# Patient Record
Sex: FEMALE | Race: WHITE | NOT HISPANIC OR LATINO | URBAN - METROPOLITAN AREA
[De-identification: names, ages, dates, MRNs, and addresses within clinical notes are randomized per-mention and may not be internally consistent; named-entity substitution may affect disease eponyms.]

---

## 2021-03-17 ENCOUNTER — INPATIENT (INPATIENT)
Facility: HOSPITAL | Age: 61
LOS: 1 days | Discharge: HOME | End: 2021-03-19
Attending: STUDENT IN AN ORGANIZED HEALTH CARE EDUCATION/TRAINING PROGRAM | Admitting: STUDENT IN AN ORGANIZED HEALTH CARE EDUCATION/TRAINING PROGRAM
Payer: COMMERCIAL

## 2021-03-17 ENCOUNTER — TRANSCRIPTION ENCOUNTER (OUTPATIENT)
Age: 61
End: 2021-03-17

## 2021-03-17 VITALS
OXYGEN SATURATION: 99 % | DIASTOLIC BLOOD PRESSURE: 80 MMHG | TEMPERATURE: 98 F | SYSTOLIC BLOOD PRESSURE: 181 MMHG | HEART RATE: 70 BPM | RESPIRATION RATE: 20 BRPM

## 2021-03-17 DIAGNOSIS — K81.0 ACUTE CHOLECYSTITIS: ICD-10-CM

## 2021-03-17 LAB
ALBUMIN SERPL ELPH-MCNC: 4.6 G/DL — SIGNIFICANT CHANGE UP (ref 3.3–5)
ALP SERPL-CCNC: 88 U/L — SIGNIFICANT CHANGE UP (ref 40–120)
ALT FLD-CCNC: 44 U/L — HIGH (ref 4–33)
ANION GAP SERPL CALC-SCNC: 11 MMOL/L — SIGNIFICANT CHANGE UP (ref 7–14)
APTT BLD: 28.3 SEC — SIGNIFICANT CHANGE UP (ref 27–36.3)
AST SERPL-CCNC: 41 U/L — HIGH (ref 4–32)
BASOPHILS # BLD AUTO: 0 K/UL — SIGNIFICANT CHANGE UP (ref 0–0.2)
BASOPHILS NFR BLD AUTO: 0 % — SIGNIFICANT CHANGE UP (ref 0–2)
BILIRUB SERPL-MCNC: 0.3 MG/DL — SIGNIFICANT CHANGE UP (ref 0.2–1.2)
BLD GP AB SCN SERPL QL: NEGATIVE — SIGNIFICANT CHANGE UP
BUN SERPL-MCNC: 19 MG/DL — SIGNIFICANT CHANGE UP (ref 7–23)
CALCIUM SERPL-MCNC: 9.6 MG/DL — SIGNIFICANT CHANGE UP (ref 8.4–10.5)
CHLORIDE SERPL-SCNC: 103 MMOL/L — SIGNIFICANT CHANGE UP (ref 98–107)
CO2 SERPL-SCNC: 27 MMOL/L — SIGNIFICANT CHANGE UP (ref 22–31)
CREAT SERPL-MCNC: 0.72 MG/DL — SIGNIFICANT CHANGE UP (ref 0.5–1.3)
EOSINOPHIL # BLD AUTO: 0 K/UL — SIGNIFICANT CHANGE UP (ref 0–0.5)
EOSINOPHIL NFR BLD AUTO: 0 % — SIGNIFICANT CHANGE UP (ref 0–6)
GLUCOSE SERPL-MCNC: 120 MG/DL — HIGH (ref 70–99)
HCT VFR BLD CALC: 44.2 % — SIGNIFICANT CHANGE UP (ref 34.5–45)
HGB BLD-MCNC: 14.1 G/DL — SIGNIFICANT CHANGE UP (ref 11.5–15.5)
IANC: 7.48 K/UL — SIGNIFICANT CHANGE UP (ref 1.5–8.5)
INR BLD: 1.05 RATIO — SIGNIFICANT CHANGE UP (ref 0.88–1.16)
LIDOCAIN IGE QN: 29 U/L — SIGNIFICANT CHANGE UP (ref 7–60)
LYMPHOCYTES # BLD AUTO: 0.46 K/UL — LOW (ref 1–3.3)
LYMPHOCYTES # BLD AUTO: 5.3 % — LOW (ref 13–44)
MAGNESIUM SERPL-MCNC: 1.9 MG/DL — SIGNIFICANT CHANGE UP (ref 1.6–2.6)
MANUAL SMEAR VERIFICATION: SIGNIFICANT CHANGE UP
MCHC RBC-ENTMCNC: 29.5 PG — SIGNIFICANT CHANGE UP (ref 27–34)
MCHC RBC-ENTMCNC: 31.9 GM/DL — LOW (ref 32–36)
MCV RBC AUTO: 92.5 FL — SIGNIFICANT CHANGE UP (ref 80–100)
MONOCYTES # BLD AUTO: 0.15 K/UL — SIGNIFICANT CHANGE UP (ref 0–0.9)
MONOCYTES NFR BLD AUTO: 1.8 % — LOW (ref 2–14)
NEUTROPHILS # BLD AUTO: 7.83 K/UL — HIGH (ref 1.8–7.4)
NEUTROPHILS NFR BLD AUTO: 91.1 % — HIGH (ref 43–77)
PHOSPHATE SERPL-MCNC: 3.1 MG/DL — SIGNIFICANT CHANGE UP (ref 2.5–4.5)
PLAT MORPH BLD: ABNORMAL
PLATELET # BLD AUTO: 234 K/UL — SIGNIFICANT CHANGE UP (ref 150–400)
PLATELET COUNT - ESTIMATE: NORMAL — SIGNIFICANT CHANGE UP
POLYCHROMASIA BLD QL SMEAR: SIGNIFICANT CHANGE UP
POTASSIUM SERPL-MCNC: 4.1 MMOL/L — SIGNIFICANT CHANGE UP (ref 3.5–5.3)
POTASSIUM SERPL-SCNC: 4.1 MMOL/L — SIGNIFICANT CHANGE UP (ref 3.5–5.3)
PROT SERPL-MCNC: 7.6 G/DL — SIGNIFICANT CHANGE UP (ref 6–8.3)
PROTHROM AB SERPL-ACNC: 12 SEC — SIGNIFICANT CHANGE UP (ref 10.6–13.6)
RBC # BLD: 4.78 M/UL — SIGNIFICANT CHANGE UP (ref 3.8–5.2)
RBC # FLD: 12 % — SIGNIFICANT CHANGE UP (ref 10.3–14.5)
RBC BLD AUTO: ABNORMAL
RH IG SCN BLD-IMP: POSITIVE — SIGNIFICANT CHANGE UP
SODIUM SERPL-SCNC: 141 MMOL/L — SIGNIFICANT CHANGE UP (ref 135–145)
VARIANT LYMPHS # BLD: 1.8 % — SIGNIFICANT CHANGE UP (ref 0–6)
WBC # BLD: 8.6 K/UL — SIGNIFICANT CHANGE UP (ref 3.8–10.5)
WBC # FLD AUTO: 8.6 K/UL — SIGNIFICANT CHANGE UP (ref 3.8–10.5)

## 2021-03-17 PROCEDURE — 74177 CT ABD & PELVIS W/CONTRAST: CPT | Mod: 26

## 2021-03-17 PROCEDURE — 99221 1ST HOSP IP/OBS SF/LOW 40: CPT | Mod: 57

## 2021-03-17 PROCEDURE — 99285 EMERGENCY DEPT VISIT HI MDM: CPT | Mod: 25

## 2021-03-17 RX ORDER — SODIUM CHLORIDE 9 MG/ML
1000 INJECTION, SOLUTION INTRAVENOUS
Refills: 0 | Status: DISCONTINUED | OUTPATIENT
Start: 2021-03-17 | End: 2021-03-18

## 2021-03-17 RX ORDER — ASPIRIN/CALCIUM CARB/MAGNESIUM 324 MG
1 TABLET ORAL
Qty: 0 | Refills: 0 | DISCHARGE

## 2021-03-17 RX ORDER — PIPERACILLIN AND TAZOBACTAM 4; .5 G/20ML; G/20ML
3.38 INJECTION, POWDER, LYOPHILIZED, FOR SOLUTION INTRAVENOUS ONCE
Refills: 0 | Status: COMPLETED | OUTPATIENT
Start: 2021-03-17 | End: 2021-03-17

## 2021-03-17 RX ORDER — FAMOTIDINE 10 MG/ML
20 INJECTION INTRAVENOUS ONCE
Refills: 0 | Status: COMPLETED | OUTPATIENT
Start: 2021-03-17 | End: 2021-03-17

## 2021-03-17 RX ORDER — ACETAMINOPHEN 500 MG
1000 TABLET ORAL ONCE
Refills: 0 | Status: COMPLETED | OUTPATIENT
Start: 2021-03-17 | End: 2021-03-18

## 2021-03-17 RX ORDER — PIPERACILLIN AND TAZOBACTAM 4; .5 G/20ML; G/20ML
3.38 INJECTION, POWDER, LYOPHILIZED, FOR SOLUTION INTRAVENOUS EVERY 8 HOURS
Refills: 0 | Status: DISCONTINUED | OUTPATIENT
Start: 2021-03-17 | End: 2021-03-18

## 2021-03-17 RX ORDER — ONDANSETRON 8 MG/1
4 TABLET, FILM COATED ORAL ONCE
Refills: 0 | Status: COMPLETED | OUTPATIENT
Start: 2021-03-17 | End: 2021-03-17

## 2021-03-17 RX ORDER — SODIUM CHLORIDE 9 MG/ML
1000 INJECTION, SOLUTION INTRAVENOUS ONCE
Refills: 0 | Status: COMPLETED | OUTPATIENT
Start: 2021-03-17 | End: 2021-03-17

## 2021-03-17 RX ORDER — ENOXAPARIN SODIUM 100 MG/ML
40 INJECTION SUBCUTANEOUS DAILY
Refills: 0 | Status: DISCONTINUED | OUTPATIENT
Start: 2021-03-17 | End: 2021-03-19

## 2021-03-17 RX ORDER — HYDROMORPHONE HYDROCHLORIDE 2 MG/ML
1 INJECTION INTRAMUSCULAR; INTRAVENOUS; SUBCUTANEOUS EVERY 4 HOURS
Refills: 0 | Status: DISCONTINUED | OUTPATIENT
Start: 2021-03-17 | End: 2021-03-18

## 2021-03-17 RX ORDER — SODIUM CHLORIDE 9 MG/ML
1000 INJECTION, SOLUTION INTRAVENOUS ONCE
Refills: 0 | Status: COMPLETED | OUTPATIENT
Start: 2021-03-17 | End: 2021-03-18

## 2021-03-17 RX ORDER — METOPROLOL TARTRATE 50 MG
5 TABLET ORAL EVERY 6 HOURS
Refills: 0 | Status: DISCONTINUED | OUTPATIENT
Start: 2021-03-17 | End: 2021-03-18

## 2021-03-17 RX ORDER — ATORVASTATIN CALCIUM 80 MG/1
1 TABLET, FILM COATED ORAL
Qty: 0 | Refills: 0 | DISCHARGE

## 2021-03-17 RX ORDER — METOCLOPRAMIDE HCL 10 MG
10 TABLET ORAL ONCE
Refills: 0 | Status: COMPLETED | OUTPATIENT
Start: 2021-03-17 | End: 2021-03-17

## 2021-03-17 RX ORDER — HYDROMORPHONE HYDROCHLORIDE 2 MG/ML
0.5 INJECTION INTRAMUSCULAR; INTRAVENOUS; SUBCUTANEOUS EVERY 4 HOURS
Refills: 0 | Status: DISCONTINUED | OUTPATIENT
Start: 2021-03-17 | End: 2021-03-19

## 2021-03-17 RX ORDER — PIPERACILLIN AND TAZOBACTAM 4; .5 G/20ML; G/20ML
3.38 INJECTION, POWDER, LYOPHILIZED, FOR SOLUTION INTRAVENOUS ONCE
Refills: 0 | Status: DISCONTINUED | OUTPATIENT
Start: 2021-03-17 | End: 2021-03-17

## 2021-03-17 RX ADMIN — SODIUM CHLORIDE 1000 MILLILITER(S): 9 INJECTION, SOLUTION INTRAVENOUS at 16:00

## 2021-03-17 RX ADMIN — Medication 10 MILLIGRAM(S): at 17:05

## 2021-03-17 RX ADMIN — ONDANSETRON 4 MILLIGRAM(S): 8 TABLET, FILM COATED ORAL at 16:38

## 2021-03-17 RX ADMIN — Medication 0.5 MILLIGRAM(S): at 18:52

## 2021-03-17 RX ADMIN — Medication 10 MILLIGRAM(S): at 18:00

## 2021-03-17 RX ADMIN — PIPERACILLIN AND TAZOBACTAM 200 GRAM(S): 4; .5 INJECTION, POWDER, LYOPHILIZED, FOR SOLUTION INTRAVENOUS at 22:43

## 2021-03-17 RX ADMIN — FAMOTIDINE 20 MILLIGRAM(S): 10 INJECTION INTRAVENOUS at 21:37

## 2021-03-17 RX ADMIN — ONDANSETRON 4 MILLIGRAM(S): 8 TABLET, FILM COATED ORAL at 16:00

## 2021-03-17 NOTE — ED ADULT NURSE NOTE - OBJECTIVE STATEMENT
Pt. A&Ox4, c/o epigastric pain with n/v starting today. Denies diarrhea or fevers. #20g to right AC, labs sent. MD at bedside will continue to monitor.

## 2021-03-17 NOTE — H&P ADULT - ASSESSMENT
Assessment/Plan: 60y Female with acute cholecystitis.   Constant RUQ pain with nausea/vomiting. Describes previous episodes of biliary colic.  CT shows thickened gallbladder with large stones. Mild transaminitis, otherwise normal LFTs.    - Add on and consent for laparoscopic cholecystectomy. Risks/benefits of procedure, including CBD injury, explained to the patient.  - COVID pending  - NPO, IV hydration  - Zosyn    Pager [phone] Assessment/Plan: 60y Female with acute cholecystitis.   Constant RUQ pain with nausea/vomiting. Describes previous episodes of biliary colic.  CT shows thickened gallbladder with large stones. Mild transaminitis, otherwise normal LFTs.    - Add on and consent for laparoscopic cholecystectomy, poss open. Risks/benefits of procedure, including CBD injury, explained to the patient.  - COVID collected and results pending  - NPO, IV hydration  - Zosyn    Discussed with Dr. De Leon  Pager [phone]

## 2021-03-17 NOTE — ED PROVIDER NOTE - ATTENDING CONTRIBUTION TO CARE
61 y/o F with no PMH p/w increased vomiting and stomach cramping after eating chicken cooked with sour cream. She states she has had several episodes of vomiting, bilious vomiting  She has an intolerance to dairy. The pain is cramping located in the upper abdomen. She denies fever, chills, diarrhea, dysuria. last bowel movement was today  Pt tried a fentanyl patch for pain w/o improvement  denies fever, chills, chest pain, SOB, +abdominal pain, diarrhea, dysuria, syncope, bleeding, new rash,weakness, numbness, blurred vision  +N/V  ROS  otherwise negative as per HPI  Gen: Awake, Alert, WD, WN, NAD  Head:  NC/AT  Eyes:  PERRL, EOMI, Conjunctiva pink, lids normal, no scleral icterus  ENT: , moist mucus membranes  Neck: supple, nontender, no meningismus, no JVD, trachea midline  Cardiac/CV:  S1 S2, RRR, no M/G/R  Respiratory/Pulm:  CTAB, good air movement, normal resp effort, no wheezes/stridor/retractions/rales/rhonchi  Gastrointestinal/Abdomen:  Soft, nontender, nondistended, +BS, no rebound/guarding  Back:  no CVAT, no MLT  Ext:  warm, well perfused, moving all extremities spontaneously, no peripheral edema, distal pulses intact  Skin: intact, no rash  Neuro:  AAOx3, sensation intact, motor 5/5 x 4 extremities, normal gait, speech clear  MDM as above

## 2021-03-17 NOTE — ED ADULT NURSE NOTE - NS ED NOTE ABUSE RESPONSE YN
----- Message from Robin Jones MD sent at 10/6/2020  2:31 PM CDT -----  Clean UA, wait for culture.   Yes

## 2021-03-17 NOTE — H&P ADULT - NSHPPHYSICALEXAM_GEN_ALL_CORE
Vital Signs Last 24 Hrs  T(C): 37.2 (17 Mar 2021 21:10), Max: 37.2 (17 Mar 2021 21:10)  T(F): 98.9 (17 Mar 2021 21:10), Max: 98.9 (17 Mar 2021 21:10)  HR: 68 (17 Mar 2021 21:10) (68 - 71)  BP: 143/69 (17 Mar 2021 21:10) (143/69 - 181/80)  BP(mean): --  RR: 17 (17 Mar 2021 21:10) (17 - 20)  SpO2: 100% (17 Mar 2021 21:10) (99% - 100%)    General: A&Ox3, NAD.  Neuro: Motor and sensory grossly intact with no focal deficits.  HEENT: Anicteric sclerae.  Respiratory: Unlabored breathing.   CVS: Regular rate and rhythm.  Abdomen: Soft, non-distended, RUQ tenderness.  Extremities: Warm bilaterally w/ palpable pulses.   MSK: Intact ROM.

## 2021-03-17 NOTE — H&P ADULT - ATTENDING COMMENTS
Patient with acute cholecystitis and thickened wall on CT scan.  plan  iv antibiotics  or for lap sebastián    I have personally interviewed and examined this patient, reviewed pertinent labs and imaging, and discussed the case with colleagues, residents, and physician assistants on B Team rounds.    The active care issues are:  1. acute cholecystitis    The Acute Care Surgery (B Team) Attending Group Practice:  Dr. Eleazar Cardoso, Dr. Cristopher Olivraes, Dr. Julio Cordero, Dr. Abran De Leon,     urgent issues - spectra [id number]  nonurgent issues - [phone]  patient appointments or afterhours - [phone]

## 2021-03-17 NOTE — ED PROVIDER NOTE - OBJECTIVE STATEMENT
59 y/o F presents to the ED w/ gradual onset of nausea and vomiting. Pt states she vomiting 25x in the past day, non bloody. Pt states this started after eating chicken cutlet cooked w/ sour cream. Pt has an intolerance to dairy. States in the past when she ate dairy, she experienced GI symptoms such as increased gas, stomach cramps and nausea. Pt is in the process of switching her gastroenterologist. She had a colonoscopy last year that was normal. Her last endoscopy was 3 years ago. Pt had a normal bowel movement this morning. Denies diarrhea. Pt is still passing gas. Pt describes her abdominal pain as cramping. Denies any fever, chills, chest pain or shortness of breath. No one else who ate the meal yesterday got sick at home. No abdominal surgical hx. Pt tried a fentanyl patch last night at 4AM. Her PCP prescribes this for her intense abdominal pain.

## 2021-03-17 NOTE — ED PROVIDER NOTE - CLINICAL SUMMARY MEDICAL DECISION MAKING FREE TEXT BOX
59 y/o F with no PMH p/w increased vomiting and stomach cramping after eating chicken cooked with sour cream. She states she has had several episodes of vomiting, bilious vomiting . SHe has an intolerance to dairy. The pain is cramping located in the upper abdomen.  benign exam, likely 2/2 dairy intolerance, nause amedication, ivf, cmp, cbc, reassessment 61 y/o F with no PMH p/w increased vomiting and stomach cramping after eating chicken cooked with sour cream. She states she has had several episodes of vomiting, bilious vomiting . SHe has an intolerance to dairy. The pain is cramping located in the upper abdomen.  benign exam, likely 2/2 dairy intolerance, nausea medication, ivf, cmp, cbc, reassessment 59 y/o F with no PMH p/w increased vomiting and stomach cramping after eating chicken cooked with sour cream. She states she has had several episodes of vomiting, bilious vomiting . She has an intolerance to dairy. The pain is cramping located in the upper abdomen.  benign exam, likely 2/2 dairy intolerance vs pancreatitis vs colitis . will give nausea medication, ivf, cmp, cbc, reassessment if symptoms do not improve ct abdomen

## 2021-03-17 NOTE — H&P ADULT - HISTORY OF PRESENT ILLNESS
60-year-old female no PMH/PSH presents with one day of constant, RUQ abdominal pain associated with several episodes of bilious vomiting. Pain started after dinner time (chicken with sour cream) the day prior and has been unrelieved with Gas-X. Describes having upper abdomina/RUQ abdominal pain before, happens every 2 months, but episodes would self-resolves. Denies fevers/chills. 60-year-old female no abdominal surgery, HTN, HLD presents with one day of constant, RUQ abdominal pain associated with several episodes of bilious vomiting. Pain started after dinner time (chicken with sour cream) the day prior and has been unrelieved with Gas-X. Describes having upper abdomina/RUQ abdominal pain before, happens every 2 months, but episodes would self-resolves. Denies fevers/chills.

## 2021-03-17 NOTE — ED ADULT TRIAGE NOTE - CHIEF COMPLAINT QUOTE
pt coming with nausea/ vomiting/with ABD pain, last BM this AM normal color.   pt stated since she had dinner ABD pain started.      2nd Moderna vac on 3/14/21   Hx. GI problems? HTN did not take her meds today.

## 2021-03-17 NOTE — H&P ADULT - NSHPLABSRESULTS_GEN_ALL_CORE
CBC Full  -  ( 17 Mar 2021 17:05 )  WBC Count : 8.60 K/uL  RBC Count : 4.78 M/uL  Hemoglobin : 14.1 g/dL  Hematocrit : 44.2 %  Platelet Count - Automated : 234 K/uL  Mean Cell Volume : 92.5 fL  Mean Cell Hemoglobin : 29.5 pg  Mean Cell Hemoglobin Concentration : 31.9 gm/dL  Auto Neutrophil # : 7.83 K/uL  Auto Lymphocyte # : 0.46 K/uL  Auto Monocyte # : 0.15 K/uL  Auto Eosinophil # : 0.00 K/uL  Auto Basophil # : 0.00 K/uL  Auto Neutrophil % : 91.1 %  Auto Lymphocyte % : 5.3 %  Auto Monocyte % : 1.8 %  Auto Eosinophil % : 0.0 %  Auto Basophil % : 0.0 %    03-17    141  |  103  |  19  ----------------------------<  120<H>  4.1   |  27  |  0.72    Ca    9.6      17 Mar 2021 17:05  Phos  3.1     03-17  Mg     1.9     03-17    TPro  7.6  /  Alb  4.6  /  TBili  0.3  /  DBili  x   /  AST  41<H>  /  ALT  44<H>  /  AlkPhos  88  03-17    LIVER FUNCTIONS - ( 17 Mar 2021 17:05 )  Alb: 4.6 g/dL / Pro: 7.6 g/dL / ALK PHOS: 88 U/L / ALT: 44 U/L / AST: 41 U/L / GGT: x             RADIOLOGY:  CT Abdomen and Pelvis w/ IV Cont (03.17.21 @ 20:55)     LIVER: Within normal limits.  BILE DUCTS: Normal caliber.  GALLBLADDER: Multiple gallstones. Marked gallbladder wall edema with gallbladder wall thickness up to 10 mm.  SPLEEN: Within normal limits.  PANCREAS: Within normal limits.  ADRENALS: Within normal limits.  KIDNEYS/URETERS: No renal stones or hydronephrosis.    BLADDER: Within normal limits.  REPRODUCTIVE ORGANS: Uterus and adnexa within normal limits.    BOWEL: No bowel obstruction or overt bowel wall thickening. Appendix is normal. Colonic diverticulosis without evidence of diverticulitis.  PERITONEUM: No ascites, pneumoperitoneum, or loculated collection. No mesenteric lymphadenopathy.  VESSELS: Mild atherosclerotic calcification. Normal caliber abdominal aorta.  RETROPERITONEUM/LYMPH NODES: No lymphadenopathy.  ABDOMINAL WALL: Small fat-containing umbilical hernia.  BONES: Mild degenerative changes of the spine.    IMPRESSION:  Multiple gallstones with marked gallbladder wall edema and gallbladder wall thickness up to 10 mm concerning for acute cholecystitis.

## 2021-03-18 ENCOUNTER — TRANSCRIPTION ENCOUNTER (OUTPATIENT)
Age: 61
End: 2021-03-18

## 2021-03-18 ENCOUNTER — RESULT REVIEW (OUTPATIENT)
Age: 61
End: 2021-03-18

## 2021-03-18 LAB
ALBUMIN SERPL ELPH-MCNC: 3.6 G/DL — SIGNIFICANT CHANGE UP (ref 3.3–5)
ALP SERPL-CCNC: 68 U/L — SIGNIFICANT CHANGE UP (ref 40–120)
ALT FLD-CCNC: 29 U/L — SIGNIFICANT CHANGE UP (ref 4–33)
ANION GAP SERPL CALC-SCNC: 8 MMOL/L — SIGNIFICANT CHANGE UP (ref 7–14)
AST SERPL-CCNC: 21 U/L — SIGNIFICANT CHANGE UP (ref 4–32)
BILIRUB SERPL-MCNC: 0.3 MG/DL — SIGNIFICANT CHANGE UP (ref 0.2–1.2)
BLD GP AB SCN SERPL QL: NEGATIVE — SIGNIFICANT CHANGE UP
BUN SERPL-MCNC: 12 MG/DL — SIGNIFICANT CHANGE UP (ref 7–23)
CALCIUM SERPL-MCNC: 9.1 MG/DL — SIGNIFICANT CHANGE UP (ref 8.4–10.5)
CHLORIDE SERPL-SCNC: 105 MMOL/L — SIGNIFICANT CHANGE UP (ref 98–107)
CO2 SERPL-SCNC: 29 MMOL/L — SIGNIFICANT CHANGE UP (ref 22–31)
CREAT SERPL-MCNC: 0.79 MG/DL — SIGNIFICANT CHANGE UP (ref 0.5–1.3)
GLUCOSE SERPL-MCNC: 101 MG/DL — HIGH (ref 70–99)
HCT VFR BLD CALC: 35.6 % — SIGNIFICANT CHANGE UP (ref 34.5–45)
HGB BLD-MCNC: 11.3 G/DL — LOW (ref 11.5–15.5)
MAGNESIUM SERPL-MCNC: 2.1 MG/DL — SIGNIFICANT CHANGE UP (ref 1.6–2.6)
MCHC RBC-ENTMCNC: 29.1 PG — SIGNIFICANT CHANGE UP (ref 27–34)
MCHC RBC-ENTMCNC: 31.7 GM/DL — LOW (ref 32–36)
MCV RBC AUTO: 91.8 FL — SIGNIFICANT CHANGE UP (ref 80–100)
NRBC # BLD: 0 /100 WBCS — SIGNIFICANT CHANGE UP
NRBC # FLD: 0 K/UL — SIGNIFICANT CHANGE UP
PHOSPHATE SERPL-MCNC: 3 MG/DL — SIGNIFICANT CHANGE UP (ref 2.5–4.5)
PLATELET # BLD AUTO: 196 K/UL — SIGNIFICANT CHANGE UP (ref 150–400)
POTASSIUM SERPL-MCNC: 4 MMOL/L — SIGNIFICANT CHANGE UP (ref 3.5–5.3)
POTASSIUM SERPL-SCNC: 4 MMOL/L — SIGNIFICANT CHANGE UP (ref 3.5–5.3)
PROT SERPL-MCNC: 6.1 G/DL — SIGNIFICANT CHANGE UP (ref 6–8.3)
RBC # BLD: 3.88 M/UL — SIGNIFICANT CHANGE UP (ref 3.8–5.2)
RBC # FLD: 12.5 % — SIGNIFICANT CHANGE UP (ref 10.3–14.5)
RH IG SCN BLD-IMP: POSITIVE — SIGNIFICANT CHANGE UP
SARS-COV-2 RNA SPEC QL NAA+PROBE: SIGNIFICANT CHANGE UP
SODIUM SERPL-SCNC: 142 MMOL/L — SIGNIFICANT CHANGE UP (ref 135–145)
WBC # BLD: 8.24 K/UL — SIGNIFICANT CHANGE UP (ref 3.8–10.5)
WBC # FLD AUTO: 8.24 K/UL — SIGNIFICANT CHANGE UP (ref 3.8–10.5)

## 2021-03-18 PROCEDURE — 88304 TISSUE EXAM BY PATHOLOGIST: CPT | Mod: 26

## 2021-03-18 PROCEDURE — 47562 LAPAROSCOPIC CHOLECYSTECTOMY: CPT | Mod: GC

## 2021-03-18 PROCEDURE — 58660 LAPAROSCOPY LYSIS: CPT | Mod: GC

## 2021-03-18 RX ORDER — LISINOPRIL 2.5 MG/1
1 TABLET ORAL
Qty: 0 | Refills: 0 | DISCHARGE

## 2021-03-18 RX ORDER — ACETAMINOPHEN 500 MG
2 TABLET ORAL
Qty: 0 | Refills: 0 | DISCHARGE
Start: 2021-03-18

## 2021-03-18 RX ORDER — LISINOPRIL 2.5 MG/1
1 TABLET ORAL
Qty: 10 | Refills: 0
Start: 2021-03-18

## 2021-03-18 RX ORDER — FENTANYL CITRATE 50 UG/ML
25 INJECTION INTRAVENOUS
Refills: 0 | Status: DISCONTINUED | OUTPATIENT
Start: 2021-03-18 | End: 2021-03-18

## 2021-03-18 RX ORDER — HYDROMORPHONE HYDROCHLORIDE 2 MG/ML
0.5 INJECTION INTRAMUSCULAR; INTRAVENOUS; SUBCUTANEOUS
Refills: 0 | Status: DISCONTINUED | OUTPATIENT
Start: 2021-03-18 | End: 2021-03-18

## 2021-03-18 RX ORDER — OXYCODONE HYDROCHLORIDE 5 MG/1
5 TABLET ORAL EVERY 4 HOURS
Refills: 0 | Status: DISCONTINUED | OUTPATIENT
Start: 2021-03-18 | End: 2021-03-19

## 2021-03-18 RX ORDER — OXYCODONE HYDROCHLORIDE 5 MG/1
1 TABLET ORAL
Qty: 12 | Refills: 0
Start: 2021-03-18 | End: 2021-03-20

## 2021-03-18 RX ORDER — ACETAMINOPHEN 500 MG
650 TABLET ORAL EVERY 6 HOURS
Refills: 0 | Status: DISCONTINUED | OUTPATIENT
Start: 2021-03-18 | End: 2021-03-19

## 2021-03-18 RX ORDER — ONDANSETRON 8 MG/1
4 TABLET, FILM COATED ORAL ONCE
Refills: 0 | Status: DISCONTINUED | OUTPATIENT
Start: 2021-03-18 | End: 2021-03-18

## 2021-03-18 RX ADMIN — FENTANYL CITRATE 25 MICROGRAM(S): 50 INJECTION INTRAVENOUS at 17:30

## 2021-03-18 RX ADMIN — FENTANYL CITRATE 25 MICROGRAM(S): 50 INJECTION INTRAVENOUS at 17:46

## 2021-03-18 RX ADMIN — Medication 400 MILLIGRAM(S): at 12:21

## 2021-03-18 RX ADMIN — ENOXAPARIN SODIUM 40 MILLIGRAM(S): 100 INJECTION SUBCUTANEOUS at 12:12

## 2021-03-18 RX ADMIN — HYDROMORPHONE HYDROCHLORIDE 0.5 MILLIGRAM(S): 2 INJECTION INTRAMUSCULAR; INTRAVENOUS; SUBCUTANEOUS at 21:09

## 2021-03-18 RX ADMIN — FENTANYL CITRATE 25 MICROGRAM(S): 50 INJECTION INTRAVENOUS at 17:15

## 2021-03-18 RX ADMIN — PIPERACILLIN AND TAZOBACTAM 25 GRAM(S): 4; .5 INJECTION, POWDER, LYOPHILIZED, FOR SOLUTION INTRAVENOUS at 06:12

## 2021-03-18 RX ADMIN — FENTANYL CITRATE 25 MICROGRAM(S): 50 INJECTION INTRAVENOUS at 17:31

## 2021-03-18 RX ADMIN — PIPERACILLIN AND TAZOBACTAM 25 GRAM(S): 4; .5 INJECTION, POWDER, LYOPHILIZED, FOR SOLUTION INTRAVENOUS at 13:49

## 2021-03-18 RX ADMIN — SODIUM CHLORIDE 110 MILLILITER(S): 9 INJECTION, SOLUTION INTRAVENOUS at 03:06

## 2021-03-18 RX ADMIN — Medication 1000 MILLIGRAM(S): at 12:51

## 2021-03-18 RX ADMIN — SODIUM CHLORIDE 1000 MILLILITER(S): 9 INJECTION, SOLUTION INTRAVENOUS at 01:54

## 2021-03-18 RX ADMIN — Medication 650 MILLIGRAM(S): at 18:52

## 2021-03-18 RX ADMIN — Medication 650 MILLIGRAM(S): at 19:30

## 2021-03-18 RX ADMIN — HYDROMORPHONE HYDROCHLORIDE 0.5 MILLIGRAM(S): 2 INJECTION INTRAMUSCULAR; INTRAVENOUS; SUBCUTANEOUS at 21:39

## 2021-03-18 NOTE — DISCHARGE NOTE PROVIDER - HOSPITAL COURSE
60-year-old female no abdominal surgery, HTN, HLD presents with one day of constant, RUQ abdominal pain associated with several episodes of bilious vomiting. Pain started after dinner time (chicken with sour cream) the day prior and has been unrelieved with Gas-X. Describes having upper abdomina/RUQ abdominal pain before, happens every 2 months, but episodes would self-resolves. Denies fevers/chills. Was diagnosed with acute cholecystitis on US and CT scan. Patient was admitted to undergo Laparoscopic Cholecystectomy.  The patient tolerated the procedure well and was transferred to the floor in stable condition.  Her diet was advanced, and she was placed on PO pain medication.  Once she was ambulating well and voiding without difficulty, he was found to be stable for discharge to home.  Pain was well-controlled at the time of discharge and the patient was tolerating a full diet. She was instructed to follow up with the surgeon in 2 weeks.

## 2021-03-18 NOTE — DISCHARGE NOTE PROVIDER - NSDCCPCAREPLAN_GEN_ALL_CORE_FT
PRINCIPAL DISCHARGE DIAGNOSIS  Diagnosis: Acute cholecystitis  Assessment and Plan of Treatment:       SECONDARY DISCHARGE DIAGNOSES  Diagnosis: Nausea  Assessment and Plan of Treatment:      PRINCIPAL DISCHARGE DIAGNOSIS  Diagnosis: Acute cholecystitis  Assessment and Plan of Treatment: WOUND CARE:  Please keep incisions clean and dry. Please do not Scrub or rub incisions. Do not use lotion or powder on incisions.   BATHING: You may shower and/or sponge bathe. You may use warm soapy water in the shower and rinse, pat dry.  ACTIVITY: No heavy lifting or straining. Otherwise, you may return to your usual level of physical activity. If you are taking narcotic pain medication DO NOT drive a car, operate machinery or make important decisions.  DIET: Return to your usual diet.  NOTIFY YOUR SURGEON IF: You have any bleeding that does not stop, any pus draining from your wound(s), increased pain at surgical site, any fever (over 100.4 F) persistent nausea/vomiting, or if your pain is not controlled on your discharge pain medications.  Please follow up with your primary care physician in 1-2 weeks regarding your hospitalization.  Please follow up with your surgeon,  in 1 week. Please call office to make an appointment.         SECONDARY DISCHARGE DIAGNOSES  Diagnosis: Nausea  Assessment and Plan of Treatment:

## 2021-03-18 NOTE — DISCHARGE NOTE PROVIDER - NSDCACTIVITY_GEN_ALL_CORE
No restrictions/Return to Work/School allowed/Sex allowed/Showering allowed/Driving allowed/Walking - Indoors allowed/No heavy lifting/straining

## 2021-03-18 NOTE — DISCHARGE NOTE PROVIDER - NSDCMRMEDTOKEN_GEN_ALL_CORE_FT
acetaminophen 325 mg oral tablet: 2 tab(s) orally every 6 hours  aspirin 81 mg oral tablet, chewable: 1 tab(s) orally once a day  Lipitor 20 mg oral tablet: 1 tab(s) orally once a day  oxyCODONE 5 mg oral tablet: 1 tab(s) orally every 6 hours MDD:4 tabs

## 2021-03-18 NOTE — CHART NOTE - NSCHARTNOTEFT_GEN_A_CORE
POST-OPERATIVE NOTE    Patient is s/p laparoscopic cholecystectomy    Subjective:  Pain well-controlled. Tolerating reg diet. Denies nausea, vomiting.    Vital Signs Last 24 Hrs  T(C): 37.1 (18 Mar 2021 20:30), Max: 37.5 (17 Mar 2021 23:10)  T(F): 98.8 (18 Mar 2021 20:30), Max: 99.5 (17 Mar 2021 23:10)  HR: 62 (18 Mar 2021 20:30) (57 - 75)  BP: 143/70 (18 Mar 2021 20:30) (129/69 - 159/75)  BP(mean): 88 (18 Mar 2021 20:30) (87 - 97)  RR: 17 (18 Mar 2021 20:30) (12 - 18)  SpO2: 98% (18 Mar 2021 20:30) (96% - 100%)    I&O's Detail    17 Mar 2021 07:01  -  18 Mar 2021 07:00  --------------------------------------------------------  IN:    IV PiggyBack: 100 mL    Lactated Ringers: 110 mL  Total IN: 210 mL    OUT:  Total OUT: 0 mL    Total NET: 210 mL      18 Mar 2021 07:01  -  18 Mar 2021 21:28  --------------------------------------------------------  IN:    IV PiggyBack: 100 mL    Lactated Ringers: 1100 mL    Oral Fluid: 120 mL  Total IN: 1320 mL    OUT:    Voided (mL): 920 mL  Total OUT: 920 mL    Total NET: 400 mL          Physical Exam:  General: NAD, resting comfortably in bed  Pulmonary: Nonlabored breathing, no respiratory distress  Abdominal: soft, appropriately tender, nondistended, incision c/d/i  Extremities: [facility]      LABS:                        11.3   8.24  )-----------( 196      ( 18 Mar 2021 09:29 )             35.6     03-18    142  |  105  |  12  ----------------------------<  101<H>  4.0   |  29  |  0.79    Ca    9.1      18 Mar 2021 09:29  Phos  3.0     03-18  Mg     2.1     03-18    TPro  6.1  /  Alb  3.6  /  TBili  0.3  /  DBili  x   /  AST  21  /  ALT  29  /  AlkPhos  68  03-18    PT/INR - ( 17 Mar 2021 22:26 )   PT: 12.0 sec;   INR: 1.05 ratio         PTT - ( 17 Mar 2021 22:26 )  PTT:28.3 sec      Assessment:  The patient is a 60y Female who is now several hours post-op from a laparoscopic cholecystectomy for acute cholecystitis. Patient stable and recovering well.    Plan:  - Pain control as needed  - Low fat diet  - DVT ppx  - OOB and ambulating as tolerated  - F/u AM labs  - Admitted overnight due to patient preference. Likely dc tomorrow AM.    B Team Surgery, [id number]

## 2021-03-18 NOTE — DISCHARGE NOTE PROVIDER - CARE PROVIDER_API CALL
Julio Cordero)  Surgery; Surgical Critical Care  [address], [address]  [place] [address]  Phone: [phone]  Fax: [phone]  Follow Up Time:

## 2021-03-18 NOTE — PROGRESS NOTE ADULT - ASSESSMENT
61yo F admitted with acute cholecystitis.    Plan:  - OR today for lap sebastián  - NPO/IVF  - Zosyn  - F/u AM labs    B Team Surgery, [id number]

## 2021-03-18 NOTE — PROGRESS NOTE ADULT - SUBJECTIVE AND OBJECTIVE BOX
Overnight events:   - No acute events    SUBJECTIVE:  Reports mild RUQ abd pain. Denies nausea, vomiting.    OBJECTIVE:  Vital Signs Last 24 Hrs  T(C): 37.5 (17 Mar 2021 23:10), Max: 37.5 (17 Mar 2021 23:10)  T(F): 99.5 (17 Mar 2021 23:10), Max: 99.5 (17 Mar 2021 23:10)  HR: 72 (17 Mar 2021 23:10) (68 - 72)  BP: 149/60 (17 Mar 2021 23:10) (143/69 - 181/80)  BP(mean): --  RR: 17 (17 Mar 2021 23:10) (17 - 20)  SpO2: 100% (17 Mar 2021 23:10) (99% - 100%)        Physical Examination:  GEN: NAD, resting quietly  PULM: symmetric chest rise bilaterally, no increased WOB  ABD: soft, RUQ tenderness, nondistended, no rebound or guarding  EXTR: no LE erythema, moving all extremities      LABS:                        14.1   8.60  )-----------( 234      ( 17 Mar 2021 17:05 )             44.2       03-17    141  |  103  |  19  ----------------------------<  120<H>  4.1   |  27  |  0.72    Ca    9.6      17 Mar 2021 17:05  Phos  3.1     03-17  Mg     1.9     03-17    TPro  7.6  /  Alb  4.6  /  TBili  0.3  /  DBili  x   /  AST  41<H>  /  ALT  44<H>  /  AlkPhos  88  03-17

## 2021-03-19 ENCOUNTER — TRANSCRIPTION ENCOUNTER (OUTPATIENT)
Age: 61
End: 2021-03-19

## 2021-03-19 VITALS
OXYGEN SATURATION: 95 % | SYSTOLIC BLOOD PRESSURE: 132 MMHG | RESPIRATION RATE: 18 BRPM | DIASTOLIC BLOOD PRESSURE: 54 MMHG | HEART RATE: 88 BPM | TEMPERATURE: 99 F

## 2021-03-19 LAB
ALBUMIN SERPL ELPH-MCNC: 3.6 G/DL — SIGNIFICANT CHANGE UP (ref 3.3–5)
ALP SERPL-CCNC: 75 U/L — SIGNIFICANT CHANGE UP (ref 40–120)
ALT FLD-CCNC: 147 U/L — HIGH (ref 4–33)
ANION GAP SERPL CALC-SCNC: 11 MMOL/L — SIGNIFICANT CHANGE UP (ref 7–14)
AST SERPL-CCNC: 111 U/L — HIGH (ref 4–32)
BILIRUB SERPL-MCNC: 0.3 MG/DL — SIGNIFICANT CHANGE UP (ref 0.2–1.2)
BUN SERPL-MCNC: 13 MG/DL — SIGNIFICANT CHANGE UP (ref 7–23)
CALCIUM SERPL-MCNC: 9.1 MG/DL — SIGNIFICANT CHANGE UP (ref 8.4–10.5)
CHLORIDE SERPL-SCNC: 102 MMOL/L — SIGNIFICANT CHANGE UP (ref 98–107)
CO2 SERPL-SCNC: 27 MMOL/L — SIGNIFICANT CHANGE UP (ref 22–31)
CREAT SERPL-MCNC: 0.73 MG/DL — SIGNIFICANT CHANGE UP (ref 0.5–1.3)
GLUCOSE SERPL-MCNC: 108 MG/DL — HIGH (ref 70–99)
HCT VFR BLD CALC: 36.6 % — SIGNIFICANT CHANGE UP (ref 34.5–45)
HGB BLD-MCNC: 11.6 G/DL — SIGNIFICANT CHANGE UP (ref 11.5–15.5)
MAGNESIUM SERPL-MCNC: 2 MG/DL — SIGNIFICANT CHANGE UP (ref 1.6–2.6)
MCHC RBC-ENTMCNC: 29.2 PG — SIGNIFICANT CHANGE UP (ref 27–34)
MCHC RBC-ENTMCNC: 31.7 GM/DL — LOW (ref 32–36)
MCV RBC AUTO: 92.2 FL — SIGNIFICANT CHANGE UP (ref 80–100)
NRBC # BLD: 0 /100 WBCS — SIGNIFICANT CHANGE UP
NRBC # FLD: 0 K/UL — SIGNIFICANT CHANGE UP
PHOSPHATE SERPL-MCNC: 4.2 MG/DL — SIGNIFICANT CHANGE UP (ref 2.5–4.5)
PLATELET # BLD AUTO: 199 K/UL — SIGNIFICANT CHANGE UP (ref 150–400)
POTASSIUM SERPL-MCNC: 3.8 MMOL/L — SIGNIFICANT CHANGE UP (ref 3.5–5.3)
POTASSIUM SERPL-SCNC: 3.8 MMOL/L — SIGNIFICANT CHANGE UP (ref 3.5–5.3)
PROT SERPL-MCNC: 6.1 G/DL — SIGNIFICANT CHANGE UP (ref 6–8.3)
RBC # BLD: 3.97 M/UL — SIGNIFICANT CHANGE UP (ref 3.8–5.2)
RBC # FLD: 12.2 % — SIGNIFICANT CHANGE UP (ref 10.3–14.5)
SODIUM SERPL-SCNC: 140 MMOL/L — SIGNIFICANT CHANGE UP (ref 135–145)
WBC # BLD: 8.17 K/UL — SIGNIFICANT CHANGE UP (ref 3.8–10.5)
WBC # FLD AUTO: 8.17 K/UL — SIGNIFICANT CHANGE UP (ref 3.8–10.5)

## 2021-03-19 RX ADMIN — Medication 650 MILLIGRAM(S): at 05:32

## 2021-03-19 RX ADMIN — Medication 650 MILLIGRAM(S): at 05:02

## 2021-03-19 RX ADMIN — ENOXAPARIN SODIUM 40 MILLIGRAM(S): 100 INJECTION SUBCUTANEOUS at 12:24

## 2021-03-19 RX ADMIN — Medication 650 MILLIGRAM(S): at 12:54

## 2021-03-19 RX ADMIN — Medication 650 MILLIGRAM(S): at 12:24

## 2021-03-19 NOTE — PROGRESS NOTE ADULT - SUBJECTIVE AND OBJECTIVE BOX
SURGERY: B Team  Pager: [phone]    STATUS POST:  Laparoscopic cholecystectomy    POST OPERATIVE DAY # 1      INTERVAL EVENTS/SUBJECTIVE: Patient seen and examined by surgical team at bedside. No acute events overnight. Hemodynamically stable. Pain well-controlled. Tolerating reg diet. Denies nausea, vomiting.    ______________________________________________  OBJECTIVE:   T(C): 36.8 (03-19-21 @ 04:58), Max: 37.1 (03-18-21 @ 19:30)  HR: 66 (03-19-21 @ 04:58) (57 - 82)  BP: 148/79 (03-19-21 @ 04:58) (107/54 - 159/88)  RR: 18 (03-19-21 @ 04:58) (12 - 18)  SpO2: 96% (03-19-21 @ 04:58) (96% - 100%)  Wt(kg): --  CAPILLARY BLOOD GLUCOSE        I&O's Detail    18 Mar 2021 07:01  -  19 Mar 2021 07:00  --------------------------------------------------------  IN:    IV PiggyBack: 100 mL    Lactated Ringers: 1100 mL    Oral Fluid: 120 mL  Total IN: 1320 mL    OUT:    Voided (mL): 920 mL  Total OUT: 920 mL    Total NET: 400 mL          Physical exam:  Gen: NAD, AAOx3  Abdomen: soft and nondistended, minimally tender to palpation, no erythema, no sign of acute infection, no sign of bleeding  Ext: warm and well-perfused  ______________________________________________  LABS:  CBC Full  -  ( 18 Mar 2021 09:29 )  WBC Count : 8.24 K/uL  RBC Count : 3.88 M/uL  Hemoglobin : 11.3 g/dL  Hematocrit : 35.6 %  Platelet Count - Automated : 196 K/uL  Mean Cell Volume : 91.8 fL  Mean Cell Hemoglobin : 29.1 pg  Mean Cell Hemoglobin Concentration : 31.7 gm/dL  Auto Neutrophil # : x  Auto Lymphocyte # : x  Auto Monocyte # : x  Auto Eosinophil # : x  Auto Basophil # : x  Auto Neutrophil % : x  Auto Lymphocyte % : x  Auto Monocyte % : x  Auto Eosinophil % : x  Auto Basophil % : x    03-19    140  |  102  |  13  ----------------------------<  108<H>  3.8   |  27  |  0.73    Ca    9.1      19 Mar 2021 07:47  Phos  3.0     03-18  Mg     2.0     03-19    TPro  6.1  /  Alb  3.6  /  TBili  0.3  /  DBili  x   /  AST  111<H>  /  ALT  147<H>  /  AlkPhos  75  03-19    _____________________________________________  RADIOLOGY:

## 2021-03-19 NOTE — DISCHARGE NOTE NURSING/CASE MANAGEMENT/SOCIAL WORK - NSDCPNINST_GEN_ALL_CORE
Call your provider for a follow-up appointment.  Call your provider for fever and chills; persistent nausea, vomiting, diarrhea; uncontrolled bleeding or abnormal discharge to incision sites; severe pain uncontrolled by pain medication.

## 2021-03-19 NOTE — PROGRESS NOTE ADULT - ASSESSMENT
Assessment:  The patient is a 60y Female who is now several hours post-op from a laparoscopic cholecystectomy for acute cholecystitis. Patient stable and recovering well.    Plan:  - Pain control as needed  - Low fat diet  - DVT ppx  - OOB and ambulating as tolerated  - F/u AM labs  - Admitted overnight due to patient preference. Likely dc tomorrow AM.    B Team Surgery, [id number]   Assessment:  The patient is a 60y Female who is now several hours post-op from a laparoscopic cholecystectomy for acute cholecystitis. Patient stable and recovering well.    Plan:  - Pain control as needed  - Low fat diet  - DVT ppx  - OOB and ambulating as tolerated  - F/u AM labs  - Discharge patient to home today this AM    B Team Surgery, [id number]

## 2021-03-19 NOTE — DISCHARGE NOTE NURSING/CASE MANAGEMENT/SOCIAL WORK - PATIENT PORTAL LINK FT
You can access the FollowMyHealth Patient Portal offered by [facility] by registering at the following website: [email or web address]. By joining [facility]’s FollowMyHealth portal, you will also be able to view your health information using other applications (apps) compatible with our system.

## 2021-03-21 ENCOUNTER — EMERGENCY (EMERGENCY)
Facility: HOSPITAL | Age: 61
LOS: 1 days | Discharge: HOME | End: 2021-03-21
Attending: EMERGENCY MEDICINE | Admitting: EMERGENCY MEDICINE
Payer: COMMERCIAL

## 2021-03-21 VITALS
RESPIRATION RATE: 16 BRPM | TEMPERATURE: 98 F | HEART RATE: 65 BPM | DIASTOLIC BLOOD PRESSURE: 84 MMHG | SYSTOLIC BLOOD PRESSURE: 145 MMHG | HEIGHT: 64 IN | OXYGEN SATURATION: 98 %

## 2021-03-21 VITALS
HEART RATE: 74 BPM | SYSTOLIC BLOOD PRESSURE: 157 MMHG | OXYGEN SATURATION: 98 % | TEMPERATURE: 98 F | RESPIRATION RATE: 16 BRPM | DIASTOLIC BLOOD PRESSURE: 95 MMHG

## 2021-03-21 LAB
ALBUMIN SERPL ELPH-MCNC: 4.7 G/DL — SIGNIFICANT CHANGE UP (ref 3.3–5)
ALP SERPL-CCNC: 110 U/L — SIGNIFICANT CHANGE UP (ref 40–120)
ALT FLD-CCNC: 113 U/L — HIGH (ref 4–33)
ANION GAP SERPL CALC-SCNC: 16 MMOL/L — HIGH (ref 7–14)
APPEARANCE UR: CLEAR — SIGNIFICANT CHANGE UP
AST SERPL-CCNC: 45 U/L — HIGH (ref 4–32)
BASOPHILS # BLD AUTO: 0.03 K/UL — SIGNIFICANT CHANGE UP (ref 0–0.2)
BASOPHILS NFR BLD AUTO: 0.4 % — SIGNIFICANT CHANGE UP (ref 0–2)
BILIRUB SERPL-MCNC: 0.5 MG/DL — SIGNIFICANT CHANGE UP (ref 0.2–1.2)
BILIRUB UR-MCNC: NEGATIVE — SIGNIFICANT CHANGE UP
BUN SERPL-MCNC: 11 MG/DL — SIGNIFICANT CHANGE UP (ref 7–23)
CALCIUM SERPL-MCNC: 10.3 MG/DL — SIGNIFICANT CHANGE UP (ref 8.4–10.5)
CHLORIDE SERPL-SCNC: 101 MMOL/L — SIGNIFICANT CHANGE UP (ref 98–107)
CO2 SERPL-SCNC: 27 MMOL/L — SIGNIFICANT CHANGE UP (ref 22–31)
COLOR SPEC: COLORLESS — SIGNIFICANT CHANGE UP
CREAT SERPL-MCNC: 0.73 MG/DL — SIGNIFICANT CHANGE UP (ref 0.5–1.3)
DIFF PNL FLD: NEGATIVE — SIGNIFICANT CHANGE UP
EOSINOPHIL # BLD AUTO: 0.18 K/UL — SIGNIFICANT CHANGE UP (ref 0–0.5)
EOSINOPHIL NFR BLD AUTO: 2.2 % — SIGNIFICANT CHANGE UP (ref 0–6)
GLUCOSE SERPL-MCNC: 90 MG/DL — SIGNIFICANT CHANGE UP (ref 70–99)
GLUCOSE UR QL: NEGATIVE — SIGNIFICANT CHANGE UP
HCT VFR BLD CALC: 45.8 % — HIGH (ref 34.5–45)
HGB BLD-MCNC: 14.6 G/DL — SIGNIFICANT CHANGE UP (ref 11.5–15.5)
IANC: 5.72 K/UL — SIGNIFICANT CHANGE UP (ref 1.5–8.5)
IMM GRANULOCYTES NFR BLD AUTO: 0.4 % — SIGNIFICANT CHANGE UP (ref 0–1.5)
KETONES UR-MCNC: NEGATIVE — SIGNIFICANT CHANGE UP
LEUKOCYTE ESTERASE UR-ACNC: NEGATIVE — SIGNIFICANT CHANGE UP
LYMPHOCYTES # BLD AUTO: 1.92 K/UL — SIGNIFICANT CHANGE UP (ref 1–3.3)
LYMPHOCYTES # BLD AUTO: 23 % — SIGNIFICANT CHANGE UP (ref 13–44)
MCHC RBC-ENTMCNC: 29.2 PG — SIGNIFICANT CHANGE UP (ref 27–34)
MCHC RBC-ENTMCNC: 31.9 GM/DL — LOW (ref 32–36)
MCV RBC AUTO: 91.6 FL — SIGNIFICANT CHANGE UP (ref 80–100)
MONOCYTES # BLD AUTO: 0.48 K/UL — SIGNIFICANT CHANGE UP (ref 0–0.9)
MONOCYTES NFR BLD AUTO: 5.7 % — SIGNIFICANT CHANGE UP (ref 2–14)
NEUTROPHILS # BLD AUTO: 5.72 K/UL — SIGNIFICANT CHANGE UP (ref 1.8–7.4)
NEUTROPHILS NFR BLD AUTO: 68.3 % — SIGNIFICANT CHANGE UP (ref 43–77)
NITRITE UR-MCNC: NEGATIVE — SIGNIFICANT CHANGE UP
NRBC # BLD: 0 /100 WBCS — SIGNIFICANT CHANGE UP
NRBC # FLD: 0 K/UL — SIGNIFICANT CHANGE UP
PH UR: 6.5 — SIGNIFICANT CHANGE UP (ref 5–8)
PLATELET # BLD AUTO: 254 K/UL — SIGNIFICANT CHANGE UP (ref 150–400)
POTASSIUM SERPL-MCNC: 4 MMOL/L — SIGNIFICANT CHANGE UP (ref 3.5–5.3)
POTASSIUM SERPL-SCNC: 4 MMOL/L — SIGNIFICANT CHANGE UP (ref 3.5–5.3)
PROT SERPL-MCNC: 8.3 G/DL — SIGNIFICANT CHANGE UP (ref 6–8.3)
PROT UR-MCNC: NEGATIVE — SIGNIFICANT CHANGE UP
RBC # BLD: 5 M/UL — SIGNIFICANT CHANGE UP (ref 3.8–5.2)
RBC # FLD: 12.1 % — SIGNIFICANT CHANGE UP (ref 10.3–14.5)
SODIUM SERPL-SCNC: 144 MMOL/L — SIGNIFICANT CHANGE UP (ref 135–145)
SP GR SPEC: 1.01 — LOW (ref 1.01–1.02)
UROBILINOGEN FLD QL: SIGNIFICANT CHANGE UP
WBC # BLD: 8.36 K/UL — SIGNIFICANT CHANGE UP (ref 3.8–10.5)
WBC # FLD AUTO: 8.36 K/UL — SIGNIFICANT CHANGE UP (ref 3.8–10.5)

## 2021-03-21 PROCEDURE — 93010 ELECTROCARDIOGRAM REPORT: CPT

## 2021-03-21 PROCEDURE — 99284 EMERGENCY DEPT VISIT MOD MDM: CPT | Mod: 25

## 2021-03-21 RX ORDER — ACETAMINOPHEN 500 MG
650 TABLET ORAL ONCE
Refills: 0 | Status: COMPLETED | OUTPATIENT
Start: 2021-03-21 | End: 2021-03-21

## 2021-03-21 RX ADMIN — Medication 650 MILLIGRAM(S): at 14:08

## 2021-03-21 NOTE — ED PROVIDER NOTE - OBJECTIVE STATEMENT
pt is a 61 yo F with recent cholecystectomy who presents with HTN. h/o HTN, on amlodipine 40mg qday but during post surgery days did not get meds. Was dc'd this past friday and took BP meds Sat but Sun after eating salty soup pt noticed BP was high, sys 190s. She took double dose of amlodipine 80mg and took her mother's  lasix 80mg. She had a headache and felt lightheaded but those symptoms resolved since arriving in the ED and is currently symptom free. no chest pain, SOB, HA, n/v, numbness. BP sys 170s. pt endorses frequent urination pt is a 61 yo F with recent cholecystectomy who presents with HTN. h/o HTN, on ?lisinopril 40mg qday but during post surgery days did not get meds. Was dc'd this past friday and took BP meds Sat but Sun after eating salty soup pt noticed BP was high, sys 190s. She took double dose of ?lisinopril 80mg and took her mother's  lasix 80mg. She had a headache and felt lightheaded but those symptoms resolved since arriving in the ED and is currently symptom free. no chest pain, SOB, HA, n/v, numbness. BP sys 170s. pt endorses frequent urination

## 2021-03-21 NOTE — ED PROVIDER NOTE - CLINICAL SUMMARY MEDICAL DECISION MAKING FREE TEXT BOX
pt is a 59 yo F with recent cholecystectomy who presents with HTN. No other symptoms. Will give tylenol for surgical pain and dc pt is a 61 yo F with recent cholecystectomy who presents with HTN. No other symptoms. Will give tylenol for surgical pain, check cbc and cmp as pt lightheaded and ekg. pt requests UA as freq urination since lasix.

## 2021-03-21 NOTE — ED PROVIDER NOTE - ATTENDING CONTRIBUTION TO CARE
60F p/w elevated BP.  Pt recently had CCY.  Pt didn't get her BP med during that stay.  Today salty soup.  Today took 80mg lisinopril (usu dose 40), then took  lasix, has been urinating a lot since then.  BP systolic 170.  HA improved.  Pt having some abd discomfort from the surgery.  Didn't take anything for the abd pain, rx Tylenol.  EKG SR at 65 no marycarmen no std no twi.   qtc 416.  Pt in no distress.  Port sites c/d/i.  Ambulatory in ED.  Given lightheadedness would check labs, and EKG and urine.  Pt can f/u PMD tomorrow.  VS:  unremarkable except HTN    GEN - NAD;   anxious;   A+O x3   HEAD - NC/AT     ENT - PEERL, EOMI, mucous membranes    moist , no discharge      NECK: Neck supple, non-tender without lymphadenopathy, no masses, no JVD  PULM - CTA b/l,  symmetric breath sounds  COR -  normal heart sounds    ABD - , ND, NT, soft,  (+)port sites c/d/i.    BACK - no CVA tenderness, nontender spine     EXTREMS - no edema, no deformity, warm and well perfused    SKIN - no rash    or bruising      NEUROLOGIC - alert, face symmetric, speech fluent, sensation nl, motor no focal deficit.

## 2021-03-21 NOTE — ED PROVIDER NOTE - NSFOLLOWUPINSTRUCTIONS_ED_ALL_ED_FT
You were seen in the ED for high blood pressure and lightheadedness.  Your ekg, urinalysis, and bloodwork were reassuring.  Please follow up with your primary doctor about your symptoms.    For pain post-op abdominal, please take 650mg Tylenol every 6 hours as needed and the oxycotin as prescribed by your other docor.    Please follow up with your PCP in 2-3 days. Return to the ED if you experience any worsening or new symptoms or any symptoms that concern you, including fevers, chills, shortness of breath, chest pain, headache, vomiting.

## 2021-03-21 NOTE — ED PROVIDER NOTE - NS ED ROS FT
GENERAL: No fever, chills  EYES: no vision changes, no discharge.   ENT: no difficulty swallowing or speaking   CARDIAC: no chest pain/pressure, SOB, lower extremity swelling  PULMONARY: no cough, SOB  GI: no abdominal pain, n/v/d  : no dysuria  SKIN: no rashes  NEURO: +headache, + lightheadedness, no paresthesia  MSK: No joint pain, myalgia, weakness.

## 2021-03-21 NOTE — ED ADULT TRIAGE NOTE - CHIEF COMPLAINT QUOTE
Pt. dizziness, lightheadedness, near-syncope and palpitations starting 20 min ago. States her BP was elevated and doubled her Lisinopril. Discharged on Friday after appendectomy. Denies n/v or fevers

## 2021-03-21 NOTE — ED PROVIDER NOTE - PATIENT PORTAL LINK FT
You can access the FollowMyHealth Patient Portal offered by [facility] by registering at the following website: [email or web address]. By joining [facility]’s FollowMyHealth portal, you will also be able to view your health information using other applications (apps) compatible with our system.

## 2021-03-21 NOTE — ED PROVIDER NOTE - PHYSICAL EXAMINATION
Pamela Adkins MD  GENERAL: Patient awake alert NAD.  HEENT: NC/AT, Moist mucous membranes, PERRL, EOMI.  LUNGS: CTAB, no wheezes or crackles.   CARDIAC: RRR, no m/r/g.    ABDOMEN: Soft, NT, ND, No rebound, guarding. No CVA tenderness. incisions healing well  EXT: No edema. No calf tenderness.   MSK:  no pain with movement, no deformities.  NEURO: A&Ox3. Moving all extremities.  SKIN: Warm and dry. No rash.  PSYCH: Normal affect.

## 2021-03-21 NOTE — ED ADULT NURSE NOTE - OBJECTIVE STATEMENT
Pt presents to room 28, A&Ox3, ambulatory at baseline without assistance, had appendectomy 3 days ago, here for evaluation of palpitations, elevated blood pressure, and near syncope that occurred 30 mins ago. Pt denies any chest pain, nausea, vomiting, shortness of breath, diarrhea, fever, constipation, or chills.

## 2021-04-01 LAB — SURGICAL PATHOLOGY STUDY: SIGNIFICANT CHANGE UP

## 2021-06-14 NOTE — PATIENT PROFILE ADULT - DO YOU FEEL THREATENED BY OTHERS?
[facility] Radiation Oncology Follow Up Note    Patient: Lenny Chau  MRN: [id number]  Date of Service: 2021          Mr. Chau is a 78 y.o. male who is a chronic smoker and quit smoking since .  The patient had a complicated history of multiple cancer in the past as detailed as followin.  Low risk prostate cancer, clinical stage T1c N0 M0, recent grade 3+3=6 and the last PSA was 10.4 in 2006.  The patient is currently on clinical observation with no therapy.  I do not have the most recent PSA at the time of evaluation.     2.  Floor of mouth cancer, status post surgical resection in 1994 with no adjuvant therapy.     3.  Left kidney hemangioma, status post left nephrectomy on 3/26/2009.     4.  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  Patient had local recurrence and is status post surgical resection on 2009.     5.  Non-small cell lung cancer involving right lung, stage I, status post right thoracotomy and excision of right upper lobe and right middle lobe lung tumor 2015 with no evidence of lymph node metastasis and no adjuvant therapy.  Patient lost to follow-up since .     6.  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 Alon Nunez, ENT on 2020.     Patient had two prior early stage right lung cancers that were apparently resected in their entirety and may have been synchronous primary lung cancers. It does not appear he had the appropriate recommended follow up at [facility]. The MELODY lesion was known to be present in 2016 and appears to be slightly larger now with significant FDG-avidity on the recent PET/CT. It is likely another primary lung cancer, although metastasis from previous lung cancers is possible.  The patient had a  restaging PET CT scan on 8/31/2020.  The scan showed enlarging FDG avid left upper lobe mass measuring 2.1 x 2.5 cm with central solid component consistent with primary lung cancer without metastasis.  There was also a FDG avid lesion in the left buccal region consistent with squamous cell carcinoma without metastasis.  The patient had a surgical resection for his left buccal/lateral tongue squamous cell carcinoma 11/6/2020 by Dr. Alon Nunez, ENT with pathology showed squamous cell carcinoma with negative margin.  He was determined not a good candidate for lung surgery given his complicated medical history and health status.  He was seen and evaluated initially on 12/16/2020 and a staging PET CT scan was recommended.  He had a PET CT scan 3 days ago and is here for evaluation and discussion of possible treatment options.     CHEMOTHERAPY HISTORY: Concurrent Chemotherapy: No     RADIATION THERAPY HISTORY: Prior Radiation: Yes     IMPLANTED CARDIAC DEVICE: none     The following portions of the patient's history were reviewed and updated as appropriate: allergies, current medications, past family history, past medical history, past social history, past surgical history and problem list.    Review of Systems    General  Constitutional (WDL): Exceptions to WDL  Fatigue: None  EENT  Eye Disorder (WDL): All eye disorder elements are within defined limits(wears redaing glasses)  Ear Disorder (WDL): Exceptions to WDL(Venetie IRA left ear d/t H&N cancer w/radiation)  Respiratory       Respiratory (WDL): Exceptions to WDL  Cough: Mild symptoms, nonprescription intervention indicated  Dyspnea: Shortness of breath with moderate exertion  Cardiovascular  Cardiovascular (WDL): Exceptions to WDL(irregular heartbeat)  Edema: Yes  Edema Limbs: 5 - 10% inter-limb discrepancy in volume or circumference at point of greatest visible difference, swelling or obscuration of anatomic architecture on close inspection(bilateral LE's R>L)  Endocrine      Gastrointestinal  Gastrointestinal (WDL): All gastrointestinal elements are within defined limits  Musculoskeletal  Musculoskeletal and Connetive Tissue Disorders (WDL): All Musculoskeletal and Connetive Tissue Disorder elements are within defined limits  Integumentary               Integumentary (WDL): Exceptions to WDL  Neurological  Neurosensory (WDL): Exceptions to WDL  Ataxia: Asymptomatic, clinical or diagnostic observations only, intervention not indicated  Psychological/Emotional   Patient Coping: Accepting  Hematological/Lymphatic  Lymph (WDL): All lymph disorder elements are within defined limits  Dermatologic     Genitourinary/Reproductive  Genitourinary (WDL): Exceptions to WDL  Urinary Frequency: Present(nocturia x2-4)  Urinary Retention: Urinary, suprapubic or intermittent catheter placement not indicated, able to void with some residual  Reproductive     Pain              Currently in Pain: No/denies  AUA Assessment                                  Accompanied by  Accompanied by: Alone    Imaging: Imaging results 30 days: Pft Complete    Result Date: 12/23/2020  Testing met ATS standards. FEV1/FVC is 54 and is reduced. FEV1 is 61% predicted and is reduced. FVC is 84% predicted and normal. There was no improvement in spirometry after a single inhaled does of bronchodilator. TLC is 102% predicted and is normal. RV is 132% predicted and is increased. DLCO is 84% predicted and is normal when it is corrected for hemoglobin. Flow volume loop normal: No - scooping of expiratory limb consistent with obstruction Impression: Pulmonary Function Test is abnormal.  Spirometry demonstrates moderate obstruction and is not consistent with reversibility. Lung volume testing There is no hyperinflation. There is air-trapping. Diffusion capacity is normal. Magda Jones MD Pulmonary and Critical Care Medicine [facility] Office: [phone]     Nm Pet Ct Skull To Mid Thigh    Result Date: 1/4/2021  EXAM: NM PET  CT SKULL TO MID THIGH LOCATION: [facility] DATE/TIME: 1/4/2021 3:05 PM INDICATION: Lung cancer, restaging. Malignant neoplasm of upper lobe of left lung. Personal history of head and neck squamous cell carcinoma involving left buccal mucosa and tongue, interval surgical resection. Subsequent treatment strategy. COMPARISON: PET/CT 08/31/2020. TECHNIQUE: Serum glucose level 152 mg/dL. One hour post intravenous administration of 9.0 mCi F-18 FDG, PET imaging was performed from the skull base to the mid thighs utilizing attenuation correction with concurrent axial CT and PET/CT image fusion. Dose reduction techniques were used. FINDINGS: Since 08/31/2020, interval postoperative changes left oral cavity; a prior focus of uptake in the left buccal region is no longer evident. Mixed solid and groundglass pulmonary nodule in the posterior left upper lobe with solid component measuring up to approximately 1.4 cm has not substantially changed in size, morphology or degree of moderate uptake since 08/31/2020 (SUVmax 5.2, previously 4.6). No evidence of metastasis. No FDG avid adenopathy in the chest or elsewhere. No suspicious focal uptake in the liver or skeleton. Normal adrenal glands. Multiple healing bilateral rib fractures with interval decrease in associated low-level inflammatory uptake. Postoperative changes right upper lobe wedge resection. No evidence of local recurrence. Marked atherosclerotic calcifications including the coronary arteries. Diffuse hepatic steatosis. Left nephrectomy and adrenalectomy. Benign simple right renal cysts, no follow-up needed. Aortobiiliac stent graft spanning a fusiform infrarenal abdominal aortic aneurysm. Excluded aneurysm sac measures up to 6.5 cm, similar to prior. Few colonic diverticula. Right MIRNA. Moderate scattered degenerative changes in the spine. Mild thoracolumbar curve.     1. Unchanged FDG avid mixed solid and groundglass pulmonary nodule in the  posterior left upper lobe, suspicious for pulmonary adenocarcinoma. No evidence of michael or distant metastasis. 2. Interval resection of prior FDG avid left oral cavity buccal lesion. No evidence of local recurrence or metastasis.        Impression      78 y.o. male with a complicated history including right retromolar trigone tumor status post surgery and postop radiation therapy, left squamous cell carcinoma involving left buccal mucosa and the lateral tongue status post surgery on 11/6/2020, non-small cell lung cancer involving right lung status post surgical resection, low risk prostate cancer on clinical observation and recent diagnosis of FDG avid left upper lobe lung mass consistent with early stage lung cancer.  The patient is not candidate for surgery and is therefore referred to radiation oncology for evaluation and consideration of possible SBRT.     Assessment & Plan:      I have personally reviewed his upcoming medical record today.  I have also reviewed his most recent radiology study including PET CT scan.  This is a 78-year-old gentleman with multiple history of cancer in the past as detailed in the history of present illness.  Patient had recent finding of left upper lobe FDG avid lung mass consistent with early stage lung cancer.  Possible treatment options for lung cancer including surgery, systemic therapy, and radiation therapy has been discussed with the patient in detail and at great lengths.  The possible risks and side effects of radiation therapy has also been explained to the patient.  Questions are answered to patient satisfaction.  He is not a good candidate for surgery due to his poor medical status.  Therefore I agree the patient is a good candidate to consider definitive radiation therapy using SBRT for his presumably early stage left lung cancer.  The pros and cons of different options has also been discussed with the patient in detail and at the great lengths.  After long  discussion, the patient elected to proceed with SBRT as his treatment choice for his left lung cancer being aware of potential risks and side effect involved.  We therefore will schedule patient to have biopsy/fiducial placement in the near future followed by simulation 1 week after.  I plan to give her radiation therapy to a total dose of 5000 cGy in 5 treatments using SBRT.     I spent approximately 30 minutes today with the patient and 80% time was used for counseling.         Mariana Batista MD, PhD  Department of Radiation Oncology   [facility]  Tel: [phone]  Page: [phone]    [facility]  [address] [place] [address]     [id number] [place] [address]      CC:  Patient Care Team:  Shade Boyd MD as PCP - General (Internal Medicine)  Alon Nunez MD as Assigned Surgical Provider  Matt Magaña MD as Assigned Heart and Vascular Provider  Naman Summers MD as Assigned Pulmonology Provider  Mariana Batista MD as Physician (Radiation Oncology)  Mariana Batista MD as Assigned Cancer Care Provider     no

## 2022-03-29 PROBLEM — I10 ESSENTIAL (PRIMARY) HYPERTENSION: Chronic | Status: ACTIVE | Noted: 2021-03-22

## 2022-04-18 ENCOUNTER — RESULT CHARGE (OUTPATIENT)
Age: 62
End: 2022-04-18

## 2022-04-19 PROBLEM — Z00.00 ENCOUNTER FOR PREVENTIVE HEALTH EXAMINATION: Status: ACTIVE | Noted: 2022-04-19

## 2022-04-19 RX ORDER — ASPIRIN ENTERIC COATED TABLETS 81 MG 81 MG/1
81 TABLET, DELAYED RELEASE ORAL
Qty: 30 | Refills: 11 | Status: ACTIVE | COMMUNITY

## 2022-04-19 RX ORDER — ATORVASTATIN CALCIUM 20 MG/1
20 TABLET, FILM COATED ORAL
Refills: 1 | Status: ACTIVE | COMMUNITY

## 2022-04-21 ENCOUNTER — APPOINTMENT (OUTPATIENT)
Dept: CARDIOLOGY | Facility: CLINIC | Age: 62
End: 2022-04-21
Payer: COMMERCIAL

## 2022-04-21 ENCOUNTER — NON-APPOINTMENT (OUTPATIENT)
Age: 62
End: 2022-04-21

## 2022-04-21 VITALS
HEART RATE: 74 BPM | OXYGEN SATURATION: 98 % | DIASTOLIC BLOOD PRESSURE: 81 MMHG | WEIGHT: 150 LBS | SYSTOLIC BLOOD PRESSURE: 146 MMHG | HEIGHT: 65 IN | BODY MASS INDEX: 24.99 KG/M2

## 2022-04-21 DIAGNOSIS — Z82.49 FAMILY HISTORY OF ISCHEMIC HEART DISEASE AND OTHER DISEASES OF THE CIRCULATORY SYSTEM: ICD-10-CM

## 2022-04-21 DIAGNOSIS — Z78.9 OTHER SPECIFIED HEALTH STATUS: ICD-10-CM

## 2022-04-21 DIAGNOSIS — Z87.898 PERSONAL HISTORY OF OTHER SPECIFIED CONDITIONS: ICD-10-CM

## 2022-04-21 PROCEDURE — 93000 ELECTROCARDIOGRAM COMPLETE: CPT

## 2022-04-21 PROCEDURE — 99203 OFFICE O/P NEW LOW 30 MIN: CPT

## 2022-04-21 RX ORDER — LISINOPRIL 40 MG/1
40 TABLET ORAL DAILY
Refills: 0 | Status: ACTIVE | COMMUNITY
Start: 2022-04-21

## 2022-04-21 RX ORDER — OXYCODONE HYDROCHLORIDE 5 MG/1
5 CAPSULE ORAL
Refills: 0 | Status: DISCONTINUED | COMMUNITY
End: 2022-04-21

## 2022-04-21 RX ORDER — MULTIVITAMIN
TABLET ORAL
Refills: 0 | Status: ACTIVE | COMMUNITY
Start: 2022-04-21

## 2022-04-21 NOTE — DISCUSSION/SUMMARY
[FreeTextEntry1] : The patient is a 61-year-old female Htn, Hld with dyspnea. 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

## 2022-04-21 NOTE — HISTORY OF PRESENT ILLNESS
[FreeTextEntry1] : Lucita is a 61-year-old female ( recently passed away) who is here at the request of her children. She has Htn and HLD. Four children 19-36yo. She had a cardiac cath at [facility] three years ago which was normal.

## 2022-04-21 NOTE — REVIEW OF SYSTEMS
[Palpitations] : palpitations [Negative] : Heme/Lymph [SOB] : no shortness of breath [Dyspnea on exertion] : not dyspnea during exertion [Chest Discomfort] : no chest discomfort [Lower Ext Edema] : no extremity edema [Leg Claudication] : no intermittent leg claudication

## 2022-07-08 ENCOUNTER — APPOINTMENT (OUTPATIENT)
Dept: CV DIAGNOSITCS | Facility: HOSPITAL | Age: 62
End: 2022-07-08

## 2022-07-08 ENCOUNTER — OUTPATIENT (OUTPATIENT)
Dept: OUTPATIENT SERVICES | Facility: HOSPITAL | Age: 62
LOS: 1 days | End: 2022-07-08
Payer: COMMERCIAL

## 2022-07-08 ENCOUNTER — APPOINTMENT (OUTPATIENT)
Dept: CV DIAGNOSTICS | Facility: HOSPITAL | Age: 62
End: 2022-07-08

## 2022-07-08 DIAGNOSIS — R06.00 DYSPNEA, UNSPECIFIED: ICD-10-CM

## 2022-07-08 PROCEDURE — 93016 CV STRESS TEST SUPVJ ONLY: CPT

## 2022-07-08 PROCEDURE — 93017 CV STRESS TEST TRACING ONLY: CPT

## 2022-07-08 PROCEDURE — 93356 MYOCRD STRAIN IMG SPCKL TRCK: CPT

## 2022-07-08 PROCEDURE — 93018 CV STRESS TEST I&R ONLY: CPT

## 2022-07-08 PROCEDURE — 93306 TTE W/DOPPLER COMPLETE: CPT | Mod: 26

## 2022-07-08 PROCEDURE — 76376 3D RENDER W/INTRP POSTPROCES: CPT

## 2022-07-08 PROCEDURE — 93306 TTE W/DOPPLER COMPLETE: CPT

## 2022-07-08 PROCEDURE — 76376 3D RENDER W/INTRP POSTPROCES: CPT | Mod: 26

## 2022-08-02 ENCOUNTER — APPOINTMENT (OUTPATIENT)
Dept: CARDIOLOGY | Facility: CLINIC | Age: 62
End: 2022-08-02

## 2022-08-02 VITALS
WEIGHT: 150 LBS | HEART RATE: 77 BPM | DIASTOLIC BLOOD PRESSURE: 69 MMHG | HEIGHT: 65 IN | SYSTOLIC BLOOD PRESSURE: 113 MMHG | OXYGEN SATURATION: 99 % | BODY MASS INDEX: 24.99 KG/M2

## 2022-08-02 DIAGNOSIS — R06.00 DYSPNEA, UNSPECIFIED: ICD-10-CM

## 2022-08-02 DIAGNOSIS — I10 ESSENTIAL (PRIMARY) HYPERTENSION: ICD-10-CM

## 2022-08-02 DIAGNOSIS — E78.5 HYPERLIPIDEMIA, UNSPECIFIED: ICD-10-CM

## 2022-08-02 PROCEDURE — 99213 OFFICE O/P EST LOW 20 MIN: CPT | Mod: 25

## 2022-08-02 PROCEDURE — 93000 ELECTROCARDIOGRAM COMPLETE: CPT

## 2022-08-02 RX ORDER — TOBRAMYCIN AND DEXAMETHASONE 3; 1 MG/ML; MG/ML
0.3-0.1 SUSPENSION/ DROPS OPHTHALMIC
Qty: 5 | Refills: 0 | Status: DISCONTINUED | COMMUNITY
Start: 2022-07-01

## 2022-08-02 RX ORDER — TERBINAFINE HYDROCHLORIDE 250 MG/1
250 TABLET ORAL
Qty: 30 | Refills: 0 | Status: DISCONTINUED | COMMUNITY
Start: 2022-06-01

## 2022-08-02 RX ORDER — CLOTRIMAZOLE 10 MG/ML
1 SOLUTION TOPICAL
Qty: 30 | Refills: 0 | Status: DISCONTINUED | COMMUNITY
Start: 2022-04-28

## 2022-08-02 RX ORDER — CICLOPIROX 80 MG/ML
8 SOLUTION TOPICAL
Qty: 6 | Refills: 0 | Status: DISCONTINUED | COMMUNITY
Start: 2022-04-21

## 2022-08-03 PROBLEM — R06.00 DYSPNEA: Status: ACTIVE | Noted: 2022-04-21

## 2022-08-03 PROBLEM — E78.5 HYPERLIPEMIA: Status: ACTIVE | Noted: 2022-04-19

## 2022-08-03 PROBLEM — I10 ESSENTIAL HYPERTENSION: Status: ACTIVE | Noted: 2022-04-19

## 2022-08-03 NOTE — DISCUSSION/SUMMARY
[EKG obtained to assist in diagnosis and management of assessed problem(s)] : EKG obtained to assist in diagnosis and management of assessed problem(s) [___ Month(s)] : in [unfilled] month(s) [FreeTextEntry1] : The patient is a 61-year-old female HTN, HLD with dyspnea. \par #1 CV- normal CORS at 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 Stress mgmt discussed. We discussed adherence to a Mediterranean diet, weight loss and at least 30 minutes of daily exercise and then reevaluate how she is doing.

## 2022-08-03 NOTE — HISTORY OF PRESENT ILLNESS
[FreeTextEntry1] : Lucita knows she is deconditioned. No exercise and thinking she should lose 10 weight. No CP, palpitations or SOB.

## 2022-08-09 ENCOUNTER — APPOINTMENT (OUTPATIENT)
Dept: CARDIOLOGY | Facility: CLINIC | Age: 62
End: 2022-08-09

## 2022-12-08 ENCOUNTER — APPOINTMENT (OUTPATIENT)
Dept: CARDIOLOGY | Facility: CLINIC | Age: 62
End: 2022-12-08

## 2023-01-20 NOTE — ED ADULT NURSE NOTE - PRIMARY CARE PROVIDER
----- Message from Nivia Pendleton NP sent at 1/20/2023 11:32 AM CST -----  CHEST: The lungs are normally aerated. The heart mediastinal structures are  normal. There is no pleural effusion.     Right RIBS: No fracture is identified. Bone mineralization is normal. Soft  tissues are unremarkable.        IMPRESSION:  Negative chest and right ribs.   unknown

## 2023-05-10 ENCOUNTER — APPOINTMENT (OUTPATIENT)
Dept: OTOLARYNGOLOGY | Facility: CLINIC | Age: 63
End: 2023-05-10

## 2023-05-18 NOTE — ED PROVIDER NOTE - INTERNATIONAL TRAVEL
Aperture Size (Optional): C
Show Applicator Variable?: Yes
Render Note In Bullet Format When Appropriate: No
Duration Of Freeze Thaw-Cycle (Seconds): 2
Post-Care Instructions: I reviewed with the patient in detail post-care instructions. Patient is to wear sunprotection, and avoid picking at any of the treated lesions. Pt may apply Aquaphor to crusted or scabbing areas.
Detail Level: Detailed
Number Of Freeze-Thaw Cycles: 1 freeze-thaw cycle
Consent: The patient's consent was obtained including but not limited to risks of crusting, scabbing, blistering, scarring, darker or lighter pigmentary change, recurrence, incomplete removal and infection.
No

## 2024-05-30 ENCOUNTER — EMERGENCY (EMERGENCY)
Facility: HOSPITAL | Age: 64
LOS: 0 days | Discharge: HOME | End: 2024-05-31
Attending: EMERGENCY MEDICINE
Payer: COMMERCIAL

## 2024-05-30 VITALS
TEMPERATURE: 98 F | HEART RATE: 92 BPM | SYSTOLIC BLOOD PRESSURE: 157 MMHG | OXYGEN SATURATION: 98 % | RESPIRATION RATE: 18 BRPM | WEIGHT: 150.36 LBS | DIASTOLIC BLOOD PRESSURE: 91 MMHG

## 2024-05-30 DIAGNOSIS — R55 SYNCOPE AND COLLAPSE: ICD-10-CM

## 2024-05-30 DIAGNOSIS — I44.7 LEFT BUNDLE-BRANCH BLOCK, UNSPECIFIED: ICD-10-CM

## 2024-05-30 DIAGNOSIS — R00.2 PALPITATIONS: ICD-10-CM

## 2024-05-30 DIAGNOSIS — I10 ESSENTIAL (PRIMARY) HYPERTENSION: ICD-10-CM

## 2024-05-30 LAB
ALBUMIN SERPL ELPH-MCNC: 4.4 G/DL — SIGNIFICANT CHANGE UP (ref 3.3–5)
ALP SERPL-CCNC: 88 U/L — SIGNIFICANT CHANGE UP (ref 40–120)
ALT FLD-CCNC: 29 U/L — SIGNIFICANT CHANGE UP (ref 12–78)
ANION GAP SERPL CALC-SCNC: 5 MMOL/L — SIGNIFICANT CHANGE UP (ref 5–17)
APTT BLD: 31.1 SEC — SIGNIFICANT CHANGE UP (ref 24.5–35.6)
AST SERPL-CCNC: 19 U/L — SIGNIFICANT CHANGE UP (ref 15–37)
BASOPHILS # BLD AUTO: 0.05 K/UL — SIGNIFICANT CHANGE UP (ref 0–0.2)
BASOPHILS NFR BLD AUTO: 0.6 % — SIGNIFICANT CHANGE UP (ref 0–2)
BILIRUB SERPL-MCNC: 0.5 MG/DL — SIGNIFICANT CHANGE UP (ref 0.2–1.2)
BUN SERPL-MCNC: 17 MG/DL — SIGNIFICANT CHANGE UP (ref 7–23)
CALCIUM SERPL-MCNC: 9.8 MG/DL — SIGNIFICANT CHANGE UP (ref 8.5–10.1)
CHLORIDE SERPL-SCNC: 107 MMOL/L — SIGNIFICANT CHANGE UP (ref 96–108)
CO2 SERPL-SCNC: 29 MMOL/L — SIGNIFICANT CHANGE UP (ref 22–31)
CREAT SERPL-MCNC: 0.79 MG/DL — SIGNIFICANT CHANGE UP (ref 0.5–1.3)
EGFR: 85 ML/MIN/1.73M2 — SIGNIFICANT CHANGE UP
EOSINOPHIL # BLD AUTO: 0.27 K/UL — SIGNIFICANT CHANGE UP (ref 0–0.5)
EOSINOPHIL NFR BLD AUTO: 3.1 % — SIGNIFICANT CHANGE UP (ref 0–6)
GLUCOSE SERPL-MCNC: 149 MG/DL — HIGH (ref 70–99)
HCT VFR BLD CALC: 44.8 % — SIGNIFICANT CHANGE UP (ref 34.5–45)
HGB BLD-MCNC: 14.7 G/DL — SIGNIFICANT CHANGE UP (ref 11.5–15.5)
IMM GRANULOCYTES NFR BLD AUTO: 0 % — SIGNIFICANT CHANGE UP (ref 0–0.9)
INR BLD: 0.91 RATIO — SIGNIFICANT CHANGE UP (ref 0.85–1.18)
LYMPHOCYTES # BLD AUTO: 3.67 K/UL — HIGH (ref 1–3.3)
LYMPHOCYTES # BLD AUTO: 42.3 % — SIGNIFICANT CHANGE UP (ref 13–44)
MCHC RBC-ENTMCNC: 30.2 PG — SIGNIFICANT CHANGE UP (ref 27–34)
MCHC RBC-ENTMCNC: 32.8 GM/DL — SIGNIFICANT CHANGE UP (ref 32–36)
MCV RBC AUTO: 92 FL — SIGNIFICANT CHANGE UP (ref 80–100)
MONOCYTES # BLD AUTO: 0.59 K/UL — SIGNIFICANT CHANGE UP (ref 0–0.9)
MONOCYTES NFR BLD AUTO: 6.8 % — SIGNIFICANT CHANGE UP (ref 2–14)
NEUTROPHILS # BLD AUTO: 4.1 K/UL — SIGNIFICANT CHANGE UP (ref 1.8–7.4)
NEUTROPHILS NFR BLD AUTO: 47.2 % — SIGNIFICANT CHANGE UP (ref 43–77)
NT-PROBNP SERPL-SCNC: 19 PG/ML — SIGNIFICANT CHANGE UP (ref 0–125)
PLATELET # BLD AUTO: 292 K/UL — SIGNIFICANT CHANGE UP (ref 150–400)
POTASSIUM SERPL-MCNC: 3.4 MMOL/L — LOW (ref 3.5–5.3)
POTASSIUM SERPL-SCNC: 3.4 MMOL/L — LOW (ref 3.5–5.3)
PROT SERPL-MCNC: 8.2 GM/DL — SIGNIFICANT CHANGE UP (ref 6–8.3)
PROTHROM AB SERPL-ACNC: 10.3 SEC — SIGNIFICANT CHANGE UP (ref 9.5–13)
RBC # BLD: 4.87 M/UL — SIGNIFICANT CHANGE UP (ref 3.8–5.2)
RBC # FLD: 12.6 % — SIGNIFICANT CHANGE UP (ref 10.3–14.5)
SODIUM SERPL-SCNC: 141 MMOL/L — SIGNIFICANT CHANGE UP (ref 135–145)
TROPONIN I, HIGH SENSITIVITY RESULT: 27.57 NG/L — SIGNIFICANT CHANGE UP
TSH SERPL-MCNC: 1.73 UU/ML — SIGNIFICANT CHANGE UP (ref 0.34–4.82)
WBC # BLD: 8.68 K/UL — SIGNIFICANT CHANGE UP (ref 3.8–10.5)
WBC # FLD AUTO: 8.68 K/UL — SIGNIFICANT CHANGE UP (ref 3.8–10.5)

## 2024-05-30 PROCEDURE — 71045 X-RAY EXAM CHEST 1 VIEW: CPT

## 2024-05-30 PROCEDURE — 93005 ELECTROCARDIOGRAM TRACING: CPT

## 2024-05-30 PROCEDURE — 99285 EMERGENCY DEPT VISIT HI MDM: CPT | Mod: 25

## 2024-05-30 PROCEDURE — 84484 ASSAY OF TROPONIN QUANT: CPT

## 2024-05-30 PROCEDURE — 71045 X-RAY EXAM CHEST 1 VIEW: CPT | Mod: 26

## 2024-05-30 PROCEDURE — 85730 THROMBOPLASTIN TIME PARTIAL: CPT

## 2024-05-30 PROCEDURE — 84443 ASSAY THYROID STIM HORMONE: CPT

## 2024-05-30 PROCEDURE — 36415 COLL VENOUS BLD VENIPUNCTURE: CPT

## 2024-05-30 PROCEDURE — 80053 COMPREHEN METABOLIC PANEL: CPT

## 2024-05-30 PROCEDURE — 85025 COMPLETE CBC W/AUTO DIFF WBC: CPT

## 2024-05-30 PROCEDURE — 93010 ELECTROCARDIOGRAM REPORT: CPT

## 2024-05-30 PROCEDURE — 99285 EMERGENCY DEPT VISIT HI MDM: CPT

## 2024-05-30 PROCEDURE — 83880 ASSAY OF NATRIURETIC PEPTIDE: CPT

## 2024-05-30 PROCEDURE — 85610 PROTHROMBIN TIME: CPT

## 2024-05-30 RX ORDER — SODIUM CHLORIDE 9 MG/ML
1000 INJECTION INTRAMUSCULAR; INTRAVENOUS; SUBCUTANEOUS ONCE
Refills: 0 | Status: COMPLETED | OUTPATIENT
Start: 2024-05-30 | End: 2024-05-30

## 2024-05-30 RX ADMIN — SODIUM CHLORIDE 1000 MILLILITER(S): 9 INJECTION INTRAMUSCULAR; INTRAVENOUS; SUBCUTANEOUS at 22:50

## 2024-05-30 NOTE — ED ADULT NURSE NOTE - OBJECTIVE STATEMENT
Pt presented to the ED with complaint of near syncope. Pt has a history of syncope Pt presented to the ED with complaint of near syncope. Pt has a history of syncope and palpitations. - LOC. Pt has been seen by cardiology for this concern and workup was negative. No other complaints at this time.

## 2024-05-30 NOTE — ED PROVIDER NOTE - PATIENT PORTAL LINK FT
You can access the FollowMyHealth Patient Portal offered by [facility] by registering at the following website: [email or web address]. By joining [employer]’s FollowMyHealth portal, you will also be able to view your health information using other applications (apps) compatible with our system.

## 2024-05-30 NOTE — ED PROVIDER NOTE - IN ACCORDANCE WITH [place] STATE LAW, WE OFFER EVERY PATIENT A HEPATITIS C TEST. WOULD YOU LIKE TO BE TESTED TODAY?
motion.      Cervical back: Normal range of motion and neck supple.   Skin:     General: Skin is warm and dry.   Neurological:      Mental Status: She is alert and oriented to person, place, and time.      Sensory: Sensory deficit present.      Comments: decresed sensation both feet and half lower legs   Psychiatric:         Mood and Affect: Mood normal.         Behavior: Behavior normal.        On this date 2/12/2024 I have spent 30 minutes reviewing previous notes, test results and face to face with the patient discussing the diagnosis and importance of compliance with the treatment plan as well as documenting on the day of the visit.      An electronic signature was used to authenticate this note.    --Ene Allen MD   
Opt out

## 2024-05-30 NOTE — ED PROVIDER NOTE - RATE
Patient states headache from this morning got slightly better with the tylenol. However patient states headache is getting worse again, currently rating 5/10. Patient now verbalizes \"pressure\" behind her eyes and \"glossy and blurry\" vision as well. All other neurological findings are the same as earlier assessment. Patient given ice pack for head and scheduled meds including scheduled tylenol given. Dr. Funk updated. New orders received. Will continue to monitor.   82

## 2024-05-30 NOTE — ED PROVIDER NOTE - OBJECTIVE STATEMENT
63-year-old female past medical history hypertension presents status post near syncopal episode.  Patient was sitting at her desk at work and felt an extra beat and then stood up and felt as if she was going to faint.  Patient then sat down and symptoms resolved.  No further episodes.  Patient previously followed with a cardiologist but does not recall the name.  Patient denies chest pain shortness of breath, no dysuria frequency or urgency.  No recent illness fevers or chills.  Patient denies reason for being dehydrated but states in general she does not drink.

## 2024-05-30 NOTE — ED ADULT TRIAGE NOTE - CHIEF COMPLAINT QUOTE
Pt presents to ED w/ near syncopal episode at 12pm. Pt states she felt "her heart skip a beat for 3 seconds" when she stood up, almost passed out. No LOC. Drank water and felt better. Pt states she sees a cardiologist for "heart flutters". PMH HTN.

## 2024-05-30 NOTE — ED PROVIDER NOTE - NSFOLLOWUPINSTRUCTIONS_ED_ALL_ED_FT
Please follow-up with your primary doctor to find out whether the left bundle branch block on your EKG is old or new.  Please follow-up with a cardiologist.  Return to ED if worsening palpitations chest pain or other concerning symptoms.    Arleth Zimmerman DO  Cardiology  [phone]

## 2024-05-30 NOTE — ED PROVIDER NOTE - CLINICAL SUMMARY MEDICAL DECISION MAKING FREE TEXT BOX
Patient with near syncopal episode with new left bundle on EKG will check cardiacs anticipate admission.    I interpreted the EKG myself.  The EKG was normal sinus rhythm.  There there was a new left bundle.  Negative Sgarbossa criteria. Patient with near syncopal episode with new left bundle on EKG will check cardiacs anticipate admission.    I interpreted the EKG myself.  The EKG was normal sinus rhythm.  There there was a new left bundle.  Negative Sgarbossa criteria.    Patient states she has history of abnormality on EKG which her primary is following, is unsure if it was the left bundle, patient has no chest pain, no further palpitations, okay for discharge.  Will follow-up with cardiology and PCP.

## 2024-05-31 VITALS
RESPIRATION RATE: 18 BRPM | OXYGEN SATURATION: 97 % | SYSTOLIC BLOOD PRESSURE: 129 MMHG | DIASTOLIC BLOOD PRESSURE: 62 MMHG | TEMPERATURE: 98 F | HEART RATE: 57 BPM

## 2024-06-04 ENCOUNTER — APPOINTMENT (OUTPATIENT)
Dept: CARDIOLOGY | Facility: CLINIC | Age: 64
End: 2024-06-04
Payer: COMMERCIAL

## 2024-06-04 VITALS
WEIGHT: 149 LBS | SYSTOLIC BLOOD PRESSURE: 147 MMHG | DIASTOLIC BLOOD PRESSURE: 85 MMHG | OXYGEN SATURATION: 97 % | HEIGHT: 65 IN | BODY MASS INDEX: 24.83 KG/M2 | HEART RATE: 81 BPM

## 2024-06-04 DIAGNOSIS — I44.7 LEFT BUNDLE-BRANCH BLOCK, UNSPECIFIED: ICD-10-CM

## 2024-06-04 DIAGNOSIS — R00.2 PALPITATIONS: ICD-10-CM

## 2024-06-04 DIAGNOSIS — R01.1 CARDIAC MURMUR, UNSPECIFIED: ICD-10-CM

## 2024-06-04 PROCEDURE — 93000 ELECTROCARDIOGRAM COMPLETE: CPT | Mod: 59

## 2024-06-04 PROCEDURE — 99204 OFFICE O/P NEW MOD 45 MIN: CPT | Mod: 25

## 2024-06-04 RX ORDER — AMLODIPINE BESYLATE 5 MG/1
5 TABLET ORAL DAILY
Refills: 0 | Status: ACTIVE | COMMUNITY

## 2024-06-12 NOTE — HISTORY OF PRESENT ILLNESS
[FreeTextEntry1] : Ms. Parkinson is a 64 yo female with a hx hypertension, dyslipidemia, LBBB presenting to establish care.   Pt reports being told she had an abnormal ECG in the past- LBBB for which she underwent cardiac catheterization 5-6 years ago- reportedly normal    C/o palpitations described as skipped beats/heart fluttering with associated lightheadedness.  Also notes feeling lightheaded with heavy exertion.  She does have a hx vasovagal syncope.    Surgical hx: cholecystectomy.  Tobacco use: never Alcohol use:1-2 times monthly   Drug use: none  Family hx: father with heart disease at 41 yo  Ob hx: 4 pregnancies/children, no complications Exercise: walks 10K steps daily

## 2024-06-12 NOTE — ASSESSMENT
[FreeTextEntry1] : Palpitations  ECG with LBBB - reports prior cath without disease. denies chest pain, SOB noted to be slightly hypokalemic with K 3.4. encouraged hydration, electrolyte supplementation   TSH normal  will order echocardiogram and 1 week monitor

## 2024-06-17 ENCOUNTER — APPOINTMENT (OUTPATIENT)
Dept: CARDIOLOGY | Facility: CLINIC | Age: 64
End: 2024-06-17

## 2024-06-21 ENCOUNTER — NON-APPOINTMENT (OUTPATIENT)
Age: 64
End: 2024-06-21

## 2024-06-25 ENCOUNTER — APPOINTMENT (OUTPATIENT)
Dept: CARDIOLOGY | Facility: CLINIC | Age: 64
End: 2024-06-25

## 2024-06-28 ENCOUNTER — OUTPATIENT (OUTPATIENT)
Dept: OUTPATIENT SERVICES | Facility: HOSPITAL | Age: 64
LOS: 1 days | End: 2024-06-28
Payer: COMMERCIAL

## 2024-06-28 ENCOUNTER — RESULT REVIEW (OUTPATIENT)
Age: 64
End: 2024-06-28

## 2024-06-28 DIAGNOSIS — I44.7 LEFT BUNDLE-BRANCH BLOCK, UNSPECIFIED: ICD-10-CM

## 2024-06-28 PROCEDURE — 93306 TTE W/DOPPLER COMPLETE: CPT

## 2024-06-28 PROCEDURE — 93356 MYOCRD STRAIN IMG SPCKL TRCK: CPT

## 2024-06-28 PROCEDURE — 93306 TTE W/DOPPLER COMPLETE: CPT | Mod: 26

## 2024-06-29 DIAGNOSIS — I44.7 LEFT BUNDLE-BRANCH BLOCK, UNSPECIFIED: ICD-10-CM

## 2024-07-02 ENCOUNTER — APPOINTMENT (OUTPATIENT)
Dept: CARDIOLOGY | Facility: CLINIC | Age: 64
End: 2024-07-02
Payer: COMMERCIAL

## 2024-07-02 VITALS
BODY MASS INDEX: 24.83 KG/M2 | OXYGEN SATURATION: 97 % | RESPIRATION RATE: 16 BRPM | HEART RATE: 68 BPM | DIASTOLIC BLOOD PRESSURE: 74 MMHG | HEIGHT: 65 IN | WEIGHT: 149 LBS | SYSTOLIC BLOOD PRESSURE: 120 MMHG

## 2024-07-02 PROCEDURE — G2211 COMPLEX E/M VISIT ADD ON: CPT | Mod: NC

## 2024-07-02 PROCEDURE — 99214 OFFICE O/P EST MOD 30 MIN: CPT | Mod: 25

## 2024-07-02 PROCEDURE — 93000 ELECTROCARDIOGRAM COMPLETE: CPT

## 2024-07-16 ENCOUNTER — NON-APPOINTMENT (OUTPATIENT)
Age: 64
End: 2024-07-16

## 2025-01-14 ENCOUNTER — APPOINTMENT (OUTPATIENT)
Dept: CARDIOLOGY | Facility: CLINIC | Age: 65
End: 2025-01-14
Payer: COMMERCIAL

## 2025-01-14 VITALS
SYSTOLIC BLOOD PRESSURE: 136 MMHG | HEIGHT: 65 IN | DIASTOLIC BLOOD PRESSURE: 81 MMHG | HEART RATE: 66 BPM | BODY MASS INDEX: 22.66 KG/M2 | WEIGHT: 136 LBS | OXYGEN SATURATION: 99 %

## 2025-01-14 DIAGNOSIS — I44.7 LEFT BUNDLE-BRANCH BLOCK, UNSPECIFIED: ICD-10-CM

## 2025-01-14 DIAGNOSIS — R00.2 PALPITATIONS: ICD-10-CM

## 2025-01-14 DIAGNOSIS — I10 ESSENTIAL (PRIMARY) HYPERTENSION: ICD-10-CM

## 2025-01-14 DIAGNOSIS — E78.5 HYPERLIPIDEMIA, UNSPECIFIED: ICD-10-CM

## 2025-01-14 PROCEDURE — 93000 ELECTROCARDIOGRAM COMPLETE: CPT

## 2025-01-14 PROCEDURE — 99214 OFFICE O/P EST MOD 30 MIN: CPT

## 2025-01-14 PROCEDURE — G2211 COMPLEX E/M VISIT ADD ON: CPT | Mod: NC

## 2025-01-19 ENCOUNTER — OFFICE (OUTPATIENT)
Dept: URBAN - METROPOLITAN AREA CLINIC 1 | Facility: CLINIC | Age: 65
Setting detail: OPHTHALMOLOGY
End: 2025-01-19
Payer: COMMERCIAL

## 2025-01-19 DIAGNOSIS — H43.811: ICD-10-CM

## 2025-01-19 DIAGNOSIS — H25.13: ICD-10-CM

## 2025-01-19 PROCEDURE — 92201 OPSCPY EXTND RTA DRAW UNI/BI: CPT

## 2025-01-19 PROCEDURE — 92004 COMPRE OPH EXAM NEW PT 1/>: CPT

## 2025-01-19 ASSESSMENT — REFRACTION_AUTOREFRACTION
OD_SPHERE: -4.50
OS_SPHERE: -5.50
OD_CYLINDER: -0.50
OD_AXIS: 176
OS_CYLINDER: -1.25
OS_AXIS: 163

## 2025-01-19 ASSESSMENT — KERATOMETRY
OS_K2POWER_DIOPTERS: 45.00
OS_K1POWER_DIOPTERS: 43.25
OD_K2POWER_DIOPTERS: 44.75
OS_AXISANGLE_DEGREES: 088
OD_K1POWER_DIOPTERS: 43.00
OD_AXISANGLE_DEGREES: 075

## 2025-01-19 ASSESSMENT — REFRACTION_CURRENTRX
OS_VPRISM_DIRECTION: PROGS
OS_SPHERE: -5.50
OS_CYLINDER: -1.25
OD_CYLINDER: -0.50
OS_OVR_VA: 20/
OD_OVR_VA: 20/
OS_AXIS: 163
OD_SPHERE: -4.50
OD_AXIS: 176
OD_ADD: +2.70
OD_VPRISM_DIRECTION: PROGS
OS_ADD: +2.70

## 2025-01-19 ASSESSMENT — VISUAL ACUITY
OD_BCVA: 20/20-2
OS_BCVA: 20/20-2

## 2025-01-19 ASSESSMENT — CONFRONTATIONAL VISUAL FIELD TEST (CVF)
OS_FINDINGS: FULL
OD_FINDINGS: FULL

## 2025-01-19 ASSESSMENT — TONOMETRY
OS_IOP_MMHG: 16
OD_IOP_MMHG: 18

## 2025-06-16 ENCOUNTER — APPOINTMENT (OUTPATIENT)
Dept: VASCULAR SURGERY | Facility: CLINIC | Age: 65
End: 2025-06-16

## 2025-06-19 ENCOUNTER — APPOINTMENT (OUTPATIENT)
Dept: OTOLARYNGOLOGY | Facility: CLINIC | Age: 65
End: 2025-06-19
Payer: COMMERCIAL

## 2025-06-19 ENCOUNTER — NON-APPOINTMENT (OUTPATIENT)
Age: 65
End: 2025-06-19

## 2025-06-19 ENCOUNTER — APPOINTMENT (OUTPATIENT)
Dept: CARDIOLOGY | Facility: CLINIC | Age: 65
End: 2025-06-19

## 2025-06-19 VITALS
DIASTOLIC BLOOD PRESSURE: 68 MMHG | HEART RATE: 74 BPM | HEIGHT: 65 IN | OXYGEN SATURATION: 97 % | SYSTOLIC BLOOD PRESSURE: 142 MMHG | BODY MASS INDEX: 23.82 KG/M2 | WEIGHT: 143 LBS

## 2025-06-19 PROCEDURE — 92557 COMPREHENSIVE HEARING TEST: CPT

## 2025-06-19 PROCEDURE — 99204 OFFICE O/P NEW MOD 45 MIN: CPT | Mod: 25

## 2025-06-19 PROCEDURE — 31231 NASAL ENDOSCOPY DX: CPT

## 2025-06-19 PROCEDURE — G2211 COMPLEX E/M VISIT ADD ON: CPT | Mod: NC

## 2025-06-19 PROCEDURE — 92567 TYMPANOMETRY: CPT

## 2025-06-19 PROCEDURE — 99214 OFFICE O/P EST MOD 30 MIN: CPT

## 2025-06-19 RX ORDER — LEVOFLOXACIN 500 MG/1
500 TABLET, FILM COATED ORAL
Qty: 7 | Refills: 0 | Status: ACTIVE | COMMUNITY
Start: 2025-01-15

## 2025-06-19 RX ORDER — FLUTICASONE PROPIONATE 50 UG/1
50 SPRAY NASAL
Qty: 16 | Refills: 0 | Status: ACTIVE | COMMUNITY
Start: 2025-01-15

## 2025-06-24 ENCOUNTER — APPOINTMENT (OUTPATIENT)
Dept: CARDIOLOGY | Facility: CLINIC | Age: 65
End: 2025-06-24